# Patient Record
Sex: MALE | Race: WHITE | Employment: FULL TIME | URBAN - METROPOLITAN AREA
[De-identification: names, ages, dates, MRNs, and addresses within clinical notes are randomized per-mention and may not be internally consistent; named-entity substitution may affect disease eponyms.]

---

## 2019-06-12 ENCOUNTER — APPOINTMENT (OUTPATIENT)
Dept: GENERAL RADIOLOGY | Age: 39
DRG: 816 | End: 2019-06-12
Payer: MEDICARE

## 2019-06-12 ENCOUNTER — HOSPITAL ENCOUNTER (INPATIENT)
Age: 39
LOS: 4 days | Discharge: HOME | DRG: 816 | End: 2019-06-16
Attending: EMERGENCY MEDICINE | Admitting: INTERNAL MEDICINE
Payer: MEDICARE

## 2019-06-12 ENCOUNTER — APPOINTMENT (OUTPATIENT)
Dept: CT IMAGING | Age: 39
DRG: 816 | End: 2019-06-12
Payer: MEDICARE

## 2019-06-12 ENCOUNTER — APPOINTMENT (OUTPATIENT)
Dept: ULTRASOUND IMAGING | Age: 39
DRG: 816 | End: 2019-06-12
Payer: MEDICARE

## 2019-06-12 DIAGNOSIS — R41.82 ALTERED MENTAL STATUS, UNSPECIFIED ALTERED MENTAL STATUS TYPE: Primary | ICD-10-CM

## 2019-06-12 DIAGNOSIS — F19.10 DRUG ABUSE (HCC): ICD-10-CM

## 2019-06-12 DIAGNOSIS — E86.0 DEHYDRATION: ICD-10-CM

## 2019-06-12 DIAGNOSIS — R74.8 ELEVATED LIVER ENZYMES: ICD-10-CM

## 2019-06-12 PROBLEM — T50.901A DRUG OVERDOSE: Status: ACTIVE | Noted: 2019-06-12

## 2019-06-12 LAB
ACETAMINOPHEN LEVEL: <5 MCG/ML (ref 10–30)
ALBUMIN SERPL-MCNC: 3.1 G/DL (ref 3.5–5.2)
ALBUMIN SERPL-MCNC: 4.4 G/DL (ref 3.5–5.2)
ALP BLD-CCNC: 143 U/L (ref 40–129)
ALP BLD-CCNC: 87 U/L (ref 40–129)
ALT SERPL-CCNC: 407 U/L (ref 0–40)
ALT SERPL-CCNC: 469 U/L (ref 0–40)
AMMONIA: 72 UMOL/L (ref 16–60)
AMPHETAMINE SCREEN, URINE: POSITIVE
ANION GAP SERPL CALCULATED.3IONS-SCNC: 11 MMOL/L (ref 7–16)
ANION GAP SERPL CALCULATED.3IONS-SCNC: 26 MMOL/L (ref 7–16)
ANION GAP SERPL CALCULATED.3IONS-SCNC: 9 MMOL/L (ref 7–16)
ANTISTREPTOLYSIN-O: 224 IU/ML (ref 0–200)
AST SERPL-CCNC: 484 U/L (ref 0–39)
AST SERPL-CCNC: 682 U/L (ref 0–39)
B.E.: -12.8 MMOL/L (ref -3–3)
B.E.: -14.9 MMOL/L (ref -3–3)
B.E.: -2.7 MMOL/L (ref -3–3)
BACTERIA: ABNORMAL /HPF
BARBITURATE SCREEN URINE: NOT DETECTED
BASOPHILS ABSOLUTE: 0 E9/L (ref 0–0.2)
BASOPHILS RELATIVE PERCENT: 0 % (ref 0–2)
BENZODIAZEPINE SCREEN, URINE: NOT DETECTED
BILIRUB SERPL-MCNC: 0.4 MG/DL (ref 0–1.2)
BILIRUB SERPL-MCNC: 0.7 MG/DL (ref 0–1.2)
BILIRUBIN URINE: NEGATIVE
BLOOD, URINE: ABNORMAL
BUN BLDV-MCNC: 20 MG/DL (ref 6–20)
BUN BLDV-MCNC: 24 MG/DL (ref 6–20)
BUN BLDV-MCNC: 25 MG/DL (ref 6–20)
BURR CELLS: ABNORMAL
CALCIUM SERPL-MCNC: 7.1 MG/DL (ref 8.6–10.2)
CALCIUM SERPL-MCNC: 7.4 MG/DL (ref 8.6–10.2)
CALCIUM SERPL-MCNC: 8.5 MG/DL (ref 8.6–10.2)
CANNABINOID SCREEN URINE: NOT DETECTED
CASTS: ABNORMAL /LPF
CHLORIDE BLD-SCNC: 105 MMOL/L (ref 98–107)
CHLORIDE BLD-SCNC: 106 MMOL/L (ref 98–107)
CHLORIDE BLD-SCNC: 93 MMOL/L (ref 98–107)
CLARITY: CLEAR
CO2: 19 MMOL/L (ref 22–29)
CO2: 23 MMOL/L (ref 22–29)
CO2: 24 MMOL/L (ref 22–29)
COCAINE METABOLITE SCREEN URINE: POSITIVE
COHB: 1.2 % (ref 0–1.5)
COHB: 3.1 % (ref 0–1.5)
COLOR: YELLOW
COMMENT: ABNORMAL
CREAT SERPL-MCNC: 1.1 MG/DL (ref 0.7–1.2)
CREAT SERPL-MCNC: 1.1 MG/DL (ref 0.7–1.2)
CREAT SERPL-MCNC: 1.7 MG/DL (ref 0.7–1.2)
CRITICAL NOTIFICATION: YES
CRITICAL: ABNORMAL
CRITICAL: ABNORMAL
DATE ANALYZED: ABNORMAL
DATE ANALYZED: ABNORMAL
DATE OF COLLECTION: ABNORMAL
DATE OF COLLECTION: ABNORMAL
DELIVERY SYSTEMS: ABNORMAL
DEVICE: ABNORMAL
EOSINOPHILS ABSOLUTE: 0 E9/L (ref 0.05–0.5)
EOSINOPHILS RELATIVE PERCENT: 0 % (ref 0–6)
ETHANOL: 41 MG/DL (ref 0–0.08)
FIO2 ARTERIAL: 60
FIO2: 30 %
FIO2: 40 %
GFR AFRICAN AMERICAN: 55
GFR AFRICAN AMERICAN: >60
GFR AFRICAN AMERICAN: >60
GFR NON-AFRICAN AMERICAN: 46 ML/MIN/1.73
GFR NON-AFRICAN AMERICAN: >60 ML/MIN/1.73
GFR NON-AFRICAN AMERICAN: >60 ML/MIN/1.73
GLUCOSE BLD-MCNC: 102 MG/DL (ref 74–99)
GLUCOSE BLD-MCNC: 108 MG/DL (ref 74–99)
GLUCOSE BLD-MCNC: 233 MG/DL (ref 74–99)
GLUCOSE URINE: NEGATIVE MG/DL
HCO3 ARTERIAL: 16.9 MMOL/L (ref 22–26)
HCO3: 14.5 MMOL/L (ref 22–26)
HCO3: 21.3 MMOL/L (ref 22–26)
HCT VFR BLD CALC: 52.2 % (ref 37–54)
HEMOGLOBIN: 17.3 G/DL (ref 12.5–16.5)
HHB: 1.8 % (ref 0–5)
HHB: 1.9 % (ref 0–5)
KETONES, URINE: NEGATIVE MG/DL
LAB: ABNORMAL
LAB: ABNORMAL
LACTIC ACID: 1.2 MMOL/L (ref 0.5–2.2)
LACTIC ACID: 11.7 MMOL/L (ref 0.5–2.2)
LACTIC ACID: 2.5 MMOL/L (ref 0.5–2.2)
LACTIC ACID: 7.4 MMOL/L (ref 0.5–2.2)
LEUKOCYTE ESTERASE, URINE: NEGATIVE
LYMPHOCYTES ABSOLUTE: 1.8 E9/L (ref 1.5–4)
LYMPHOCYTES RELATIVE PERCENT: 15 % (ref 20–42)
Lab: ABNORMAL
Lab: ABNORMAL
MAGNESIUM: 2 MG/DL (ref 1.6–2.6)
MCH RBC QN AUTO: 33.1 PG (ref 26–35)
MCHC RBC AUTO-ENTMCNC: 33.1 % (ref 32–34.5)
MCV RBC AUTO: 100 FL (ref 80–99.9)
METAMYELOCYTES RELATIVE PERCENT: 1 % (ref 0–1)
METHADONE SCREEN, URINE: NOT DETECTED
METHB: 0.5 % (ref 0–1.5)
METHB: 0.5 % (ref 0–1.5)
MODE: AC
MONOCYTES ABSOLUTE: 0.12 E9/L (ref 0.1–0.95)
MONOCYTES RELATIVE PERCENT: 1 % (ref 2–12)
NEUTROPHILS ABSOLUTE: 10.08 E9/L (ref 1.8–7.3)
NEUTROPHILS RELATIVE PERCENT: 83 % (ref 43–80)
NITRITE, URINE: NEGATIVE
O2 CONTENT: 20.6 ML/DL
O2 CONTENT: 20.9 ML/DL
O2 SATURATION: 98.1 % (ref 92–98.5)
O2 SATURATION: 98.1 % (ref 92–98.5)
O2 SATURATION: 99.5 % (ref 92–98.5)
O2HB: 94.6 % (ref 94–97)
O2HB: 96.4 % (ref 94–97)
OPERATOR ID: 208
OPERATOR ID: 2658
OPERATOR ID: ABNORMAL
OPIATE SCREEN URINE: NOT DETECTED
OSMOLALITY: 297 MOSM/KG (ref 285–310)
PATIENT TEMP: 37 C
PATIENT TEMP: 37 C
PCO2 ARTERIAL: 62.5 MMHG (ref 35–45)
PCO2: 34.9 MMHG (ref 35–45)
PCO2: 38 MMHG (ref 35–45)
PDW BLD-RTO: 12.1 FL (ref 11.5–15)
PEEP/CPAP: 5 CMH2O
PEEP/CPAP: 5 CMH2O
PFO2: 3.83 MMHG/%
PFO2: 4.38 MMHG/%
PH BLOOD GAS: 7.04 (ref 7.35–7.45)
PH BLOOD GAS: 7.2 (ref 7.35–7.45)
PH BLOOD GAS: 7.4 (ref 7.35–7.45)
PH UA: 6.5 (ref 5–9)
PHENCYCLIDINE SCREEN URINE: NOT DETECTED
PHOSPHORUS: 2.7 MG/DL (ref 2.5–4.5)
PLATELET # BLD: 206 E9/L (ref 130–450)
PMV BLD AUTO: 10.2 FL (ref 7–12)
PO2 ARTERIAL: 236.1 MMHG (ref 80–100)
PO2: 131.3 MMHG (ref 60–100)
PO2: 153.4 MMHG (ref 60–100)
POIKILOCYTES: ABNORMAL
POSITIVE END EXP PRESS: 5 CMH2O
POTASSIUM REFLEX MAGNESIUM: 4.4 MMOL/L (ref 3.5–5)
POTASSIUM SERPL-SCNC: 3.9 MMOL/L (ref 3.5–5)
POTASSIUM SERPL-SCNC: 4 MMOL/L (ref 3.5–5)
PROCALCITONIN: 0.03 NG/ML (ref 0–0.08)
PROPOXYPHENE SCREEN: NOT DETECTED
PROTEIN UA: 100 MG/DL
RBC # BLD: 5.22 E12/L (ref 3.8–5.8)
RBC UA: ABNORMAL /HPF (ref 0–2)
RESPIRATORY RATE: 14 B/MIN
RI(T): 0.26
RI(T): 0.51
RR MECHANICAL: 18 B/MIN
RR MECHANICAL: 18 B/MIN
SALICYLATE, SERUM: <0.3 MG/DL (ref 0–30)
SODIUM BLD-SCNC: 138 MMOL/L (ref 132–146)
SODIUM BLD-SCNC: 138 MMOL/L (ref 132–146)
SODIUM BLD-SCNC: 140 MMOL/L (ref 132–146)
SOURCE, BLOOD GAS: ABNORMAL
SPECIFIC GRAVITY UA: 1.02 (ref 1–1.03)
STREP GRP A PCR: NEGATIVE
THB: 15.1 G/DL (ref 11.5–16.5)
THB: 15.5 G/DL (ref 11.5–16.5)
TIDAL VOLUME: 450 ML
TIME ANALYZED: 1335
TIME ANALYZED: 931
TOTAL CK: 189 U/L (ref 20–200)
TOTAL PROTEIN: 5.4 G/DL (ref 6.4–8.3)
TOTAL PROTEIN: 7.9 G/DL (ref 6.4–8.3)
TRICYCLIC ANTIDEPRESSANTS SCREEN SERUM: NEGATIVE NG/ML
TROPONIN: <0.01 NG/ML (ref 0–0.03)
TROPONIN: <0.01 NG/ML (ref 0–0.03)
UROBILINOGEN, URINE: 1 E.U./DL
VT MECHANICAL: 450 ML
VT MECHANICAL: 450 ML
WBC # BLD: 12 E9/L (ref 4.5–11.5)
WBC UA: ABNORMAL /HPF (ref 0–5)

## 2019-06-12 PROCEDURE — 84145 PROCALCITONIN (PCT): CPT

## 2019-06-12 PROCEDURE — 87081 CULTURE SCREEN ONLY: CPT

## 2019-06-12 PROCEDURE — 0BH17EZ INSERTION OF ENDOTRACHEAL AIRWAY INTO TRACHEA, VIA NATURAL OR ARTIFICIAL OPENING: ICD-10-PCS | Performed by: INTERNAL MEDICINE

## 2019-06-12 PROCEDURE — 72125 CT NECK SPINE W/O DYE: CPT

## 2019-06-12 PROCEDURE — 70450 CT HEAD/BRAIN W/O DYE: CPT

## 2019-06-12 PROCEDURE — 82784 ASSAY IGA/IGD/IGG/IGM EACH: CPT

## 2019-06-12 PROCEDURE — 82805 BLOOD GASES W/O2 SATURATION: CPT

## 2019-06-12 PROCEDURE — 2500000003 HC RX 250 WO HCPCS: Performed by: EMERGENCY MEDICINE

## 2019-06-12 PROCEDURE — 94002 VENT MGMT INPAT INIT DAY: CPT

## 2019-06-12 PROCEDURE — G0480 DRUG TEST DEF 1-7 CLASSES: HCPCS

## 2019-06-12 PROCEDURE — 36592 COLLECT BLOOD FROM PICC: CPT

## 2019-06-12 PROCEDURE — 02HV33Z INSERTION OF INFUSION DEVICE INTO SUPERIOR VENA CAVA, PERCUTANEOUS APPROACH: ICD-10-PCS | Performed by: EMERGENCY MEDICINE

## 2019-06-12 PROCEDURE — 6360000002 HC RX W HCPCS: Performed by: INTERNAL MEDICINE

## 2019-06-12 PROCEDURE — 2580000003 HC RX 258: Performed by: INTERNAL MEDICINE

## 2019-06-12 PROCEDURE — 2580000003 HC RX 258: Performed by: EMERGENCY MEDICINE

## 2019-06-12 PROCEDURE — 71045 X-RAY EXAM CHEST 1 VIEW: CPT

## 2019-06-12 PROCEDURE — 6360000002 HC RX W HCPCS

## 2019-06-12 PROCEDURE — C9113 INJ PANTOPRAZOLE SODIUM, VIA: HCPCS | Performed by: INTERNAL MEDICINE

## 2019-06-12 PROCEDURE — 80048 BASIC METABOLIC PNL TOTAL CA: CPT

## 2019-06-12 PROCEDURE — 2580000003 HC RX 258

## 2019-06-12 PROCEDURE — 36415 COLL VENOUS BLD VENIPUNCTURE: CPT

## 2019-06-12 PROCEDURE — 2500000003 HC RX 250 WO HCPCS: Performed by: INTERNAL MEDICINE

## 2019-06-12 PROCEDURE — 80307 DRUG TEST PRSMV CHEM ANLYZR: CPT

## 2019-06-12 PROCEDURE — 96361 HYDRATE IV INFUSION ADD-ON: CPT

## 2019-06-12 PROCEDURE — 5A1945Z RESPIRATORY VENTILATION, 24-96 CONSECUTIVE HOURS: ICD-10-PCS | Performed by: INTERNAL MEDICINE

## 2019-06-12 PROCEDURE — 99285 EMERGENCY DEPT VISIT HI MDM: CPT

## 2019-06-12 PROCEDURE — 81001 URINALYSIS AUTO W/SCOPE: CPT

## 2019-06-12 PROCEDURE — 96365 THER/PROPH/DIAG IV INF INIT: CPT

## 2019-06-12 PROCEDURE — 86703 HIV-1/HIV-2 1 RESULT ANTBDY: CPT

## 2019-06-12 PROCEDURE — 86060 ANTISTREPTOLYSIN O TITER: CPT

## 2019-06-12 PROCEDURE — 93005 ELECTROCARDIOGRAM TRACING: CPT | Performed by: EMERGENCY MEDICINE

## 2019-06-12 PROCEDURE — 82550 ASSAY OF CK (CPK): CPT

## 2019-06-12 PROCEDURE — 82803 BLOOD GASES ANY COMBINATION: CPT

## 2019-06-12 PROCEDURE — 84484 ASSAY OF TROPONIN QUANT: CPT

## 2019-06-12 PROCEDURE — 96376 TX/PRO/DX INJ SAME DRUG ADON: CPT

## 2019-06-12 PROCEDURE — 83735 ASSAY OF MAGNESIUM: CPT

## 2019-06-12 PROCEDURE — 87450 HC DIRECT STREP B ANTIGEN: CPT

## 2019-06-12 PROCEDURE — 83930 ASSAY OF BLOOD OSMOLALITY: CPT

## 2019-06-12 PROCEDURE — 96366 THER/PROPH/DIAG IV INF ADDON: CPT

## 2019-06-12 PROCEDURE — 2500000003 HC RX 250 WO HCPCS

## 2019-06-12 PROCEDURE — 94664 DEMO&/EVAL PT USE INHALER: CPT

## 2019-06-12 PROCEDURE — 96375 TX/PRO/DX INJ NEW DRUG ADDON: CPT

## 2019-06-12 PROCEDURE — 87040 BLOOD CULTURE FOR BACTERIA: CPT

## 2019-06-12 PROCEDURE — 2000000000 HC ICU R&B

## 2019-06-12 PROCEDURE — 6360000002 HC RX W HCPCS: Performed by: EMERGENCY MEDICINE

## 2019-06-12 PROCEDURE — 80074 ACUTE HEPATITIS PANEL: CPT

## 2019-06-12 PROCEDURE — 80053 COMPREHEN METABOLIC PANEL: CPT

## 2019-06-12 PROCEDURE — 84100 ASSAY OF PHOSPHORUS: CPT

## 2019-06-12 PROCEDURE — 82140 ASSAY OF AMMONIA: CPT

## 2019-06-12 PROCEDURE — 83605 ASSAY OF LACTIC ACID: CPT

## 2019-06-12 PROCEDURE — 85025 COMPLETE CBC W/AUTO DIFF WBC: CPT

## 2019-06-12 PROCEDURE — 87880 STREP A ASSAY W/OPTIC: CPT

## 2019-06-12 PROCEDURE — 6370000000 HC RX 637 (ALT 250 FOR IP): Performed by: INTERNAL MEDICINE

## 2019-06-12 RX ORDER — 0.9 % SODIUM CHLORIDE 0.9 %
500 INTRAVENOUS SOLUTION INTRAVENOUS ONCE
Status: COMPLETED | OUTPATIENT
Start: 2019-06-12 | End: 2019-06-12

## 2019-06-12 RX ORDER — SODIUM CHLORIDE 0.9 % (FLUSH) 0.9 %
SYRINGE (ML) INJECTION
Status: COMPLETED
Start: 2019-06-12 | End: 2019-06-12

## 2019-06-12 RX ORDER — DILTIAZEM HYDROCHLORIDE 5 MG/ML
7.5 INJECTION INTRAVENOUS ONCE
Status: DISCONTINUED | OUTPATIENT
Start: 2019-06-12 | End: 2019-06-12 | Stop reason: CLARIF

## 2019-06-12 RX ORDER — VECURONIUM BROMIDE 1 MG/ML
10 INJECTION, POWDER, LYOPHILIZED, FOR SOLUTION INTRAVENOUS ONCE
Status: COMPLETED | OUTPATIENT
Start: 2019-06-12 | End: 2019-06-12

## 2019-06-12 RX ORDER — IBUPROFEN 200 MG
800 TABLET ORAL EVERY 6 HOURS PRN
COMMUNITY

## 2019-06-12 RX ORDER — LORAZEPAM 2 MG/ML
2 INJECTION INTRAMUSCULAR ONCE
Status: COMPLETED | OUTPATIENT
Start: 2019-06-12 | End: 2019-06-12

## 2019-06-12 RX ORDER — IPRATROPIUM BROMIDE AND ALBUTEROL SULFATE 2.5; .5 MG/3ML; MG/3ML
1 SOLUTION RESPIRATORY (INHALATION)
Status: DISCONTINUED | OUTPATIENT
Start: 2019-06-12 | End: 2019-06-13

## 2019-06-12 RX ORDER — DEXMEDETOMIDINE HYDROCHLORIDE 4 UG/ML
0.2 INJECTION, SOLUTION INTRAVENOUS CONTINUOUS
Status: DISCONTINUED | OUTPATIENT
Start: 2019-06-12 | End: 2019-06-13

## 2019-06-12 RX ORDER — SODIUM CHLORIDE, SODIUM LACTATE, POTASSIUM CHLORIDE, CALCIUM CHLORIDE 600; 310; 30; 20 MG/100ML; MG/100ML; MG/100ML; MG/100ML
INJECTION, SOLUTION INTRAVENOUS CONTINUOUS
Status: DISCONTINUED | OUTPATIENT
Start: 2019-06-12 | End: 2019-06-15

## 2019-06-12 RX ORDER — PANTOPRAZOLE SODIUM 40 MG/10ML
40 INJECTION, POWDER, LYOPHILIZED, FOR SOLUTION INTRAVENOUS DAILY
Status: DISCONTINUED | OUTPATIENT
Start: 2019-06-12 | End: 2019-06-14

## 2019-06-12 RX ORDER — NALOXONE HYDROCHLORIDE 1 MG/ML
INJECTION INTRAMUSCULAR; INTRAVENOUS; SUBCUTANEOUS
Status: COMPLETED
Start: 2019-06-12 | End: 2019-06-12

## 2019-06-12 RX ORDER — KETAMINE HYDROCHLORIDE 10 MG/ML
1 INJECTION, SOLUTION INTRAMUSCULAR; INTRAVENOUS ONCE
Status: COMPLETED | OUTPATIENT
Start: 2019-06-12 | End: 2019-06-12

## 2019-06-12 RX ORDER — 0.9 % SODIUM CHLORIDE 0.9 %
1000 INTRAVENOUS SOLUTION INTRAVENOUS ONCE
Status: COMPLETED | OUTPATIENT
Start: 2019-06-12 | End: 2019-06-12

## 2019-06-12 RX ORDER — PROPOFOL 10 MG/ML
10 INJECTION, EMULSION INTRAVENOUS
Status: DISCONTINUED | OUTPATIENT
Start: 2019-06-12 | End: 2019-06-13

## 2019-06-12 RX ORDER — SODIUM CHLORIDE 0.9 % (FLUSH) 0.9 %
10 SYRINGE (ML) INJECTION EVERY 12 HOURS SCHEDULED
Status: DISCONTINUED | OUTPATIENT
Start: 2019-06-12 | End: 2019-06-16 | Stop reason: HOSPADM

## 2019-06-12 RX ORDER — KETAMINE HYDROCHLORIDE 10 MG/ML
INJECTION, SOLUTION INTRAMUSCULAR; INTRAVENOUS
Status: COMPLETED
Start: 2019-06-12 | End: 2019-06-12

## 2019-06-12 RX ORDER — SODIUM CHLORIDE 9 MG/ML
INJECTION, SOLUTION INTRAVENOUS CONTINUOUS
Status: DISCONTINUED | OUTPATIENT
Start: 2019-06-12 | End: 2019-06-12

## 2019-06-12 RX ORDER — PROPOFOL 10 MG/ML
10 INJECTION, EMULSION INTRAVENOUS ONCE
Status: COMPLETED | OUTPATIENT
Start: 2019-06-12 | End: 2019-06-12

## 2019-06-12 RX ORDER — SODIUM CHLORIDE 0.9 % (FLUSH) 0.9 %
10 SYRINGE (ML) INJECTION PRN
Status: DISCONTINUED | OUTPATIENT
Start: 2019-06-12 | End: 2019-06-16 | Stop reason: HOSPADM

## 2019-06-12 RX ORDER — LORAZEPAM 2 MG/ML
INJECTION INTRAMUSCULAR
Status: COMPLETED
Start: 2019-06-12 | End: 2019-06-12

## 2019-06-12 RX ORDER — 0.9 % SODIUM CHLORIDE 0.9 %
500 INTRAVENOUS SOLUTION INTRAVENOUS ONCE
Status: DISCONTINUED | OUTPATIENT
Start: 2019-06-12 | End: 2019-06-12

## 2019-06-12 RX ADMIN — SODIUM CHLORIDE 1000 ML: 9 INJECTION, SOLUTION INTRAVENOUS at 08:42

## 2019-06-12 RX ADMIN — ENOXAPARIN SODIUM 40 MG: 40 INJECTION SUBCUTANEOUS at 12:48

## 2019-06-12 RX ADMIN — NALOXONE HYDROCHLORIDE: 1 INJECTION PARENTERAL at 07:00

## 2019-06-12 RX ADMIN — LORAZEPAM 2 MG: 2 INJECTION INTRAMUSCULAR at 07:30

## 2019-06-12 RX ADMIN — LORAZEPAM 1 MG/HR: 2 INJECTION INTRAMUSCULAR; INTRAVENOUS at 08:09

## 2019-06-12 RX ADMIN — Medication 10 ML: at 12:49

## 2019-06-12 RX ADMIN — PROPOFOL 40 MCG/KG/MIN: 10 INJECTION, EMULSION INTRAVENOUS at 23:48

## 2019-06-12 RX ADMIN — KETAMINE HYDROCHLORIDE 74.4 MG: 10 INJECTION, SOLUTION INTRAMUSCULAR; INTRAVENOUS at 10:26

## 2019-06-12 RX ADMIN — WATER 2 G: 1 INJECTION INTRAMUSCULAR; INTRAVENOUS; SUBCUTANEOUS at 08:43

## 2019-06-12 RX ADMIN — PANTOPRAZOLE SODIUM 40 MG: 40 INJECTION, POWDER, FOR SOLUTION INTRAVENOUS at 12:48

## 2019-06-12 RX ADMIN — DEXMEDETOMIDINE HYDROCHLORIDE 0.2 MCG/KG/HR: 4 INJECTION, SOLUTION INTRAVENOUS at 18:48

## 2019-06-12 RX ADMIN — SODIUM CHLORIDE 1000 ML: 9 INJECTION, SOLUTION INTRAVENOUS at 07:12

## 2019-06-12 RX ADMIN — Medication 10 ML: at 07:34

## 2019-06-12 RX ADMIN — IPRATROPIUM BROMIDE AND ALBUTEROL SULFATE 1 AMPULE: .5; 3 SOLUTION RESPIRATORY (INHALATION) at 17:01

## 2019-06-12 RX ADMIN — SODIUM CHLORIDE: 9 INJECTION, SOLUTION INTRAVENOUS at 11:51

## 2019-06-12 RX ADMIN — PROPOFOL 10 MCG/KG/MIN: 10 INJECTION, EMULSION INTRAVENOUS at 11:30

## 2019-06-12 RX ADMIN — PROPOFOL 4.5 ML/HR: 10 INJECTION, EMULSION INTRAVENOUS at 10:32

## 2019-06-12 RX ADMIN — SODIUM CHLORIDE, POTASSIUM CHLORIDE, SODIUM LACTATE AND CALCIUM CHLORIDE: 600; 310; 30; 20 INJECTION, SOLUTION INTRAVENOUS at 20:12

## 2019-06-12 RX ADMIN — SODIUM CHLORIDE 500 ML: 9 INJECTION, SOLUTION INTRAVENOUS at 18:51

## 2019-06-12 RX ADMIN — SODIUM CHLORIDE, POTASSIUM CHLORIDE, SODIUM LACTATE AND CALCIUM CHLORIDE: 600; 310; 30; 20 INJECTION, SOLUTION INTRAVENOUS at 16:03

## 2019-06-12 RX ADMIN — LORAZEPAM 2 MG: 2 INJECTION INTRAMUSCULAR; INTRAVENOUS at 07:30

## 2019-06-12 RX ADMIN — Medication 10 ML: at 20:12

## 2019-06-12 RX ADMIN — VECURONIUM BROMIDE 10 MG: 1 INJECTION, POWDER, LYOPHILIZED, FOR SOLUTION INTRAVENOUS at 07:31

## 2019-06-12 RX ADMIN — SODIUM CHLORIDE 500 ML: 0.9 INJECTION, SOLUTION INTRAVENOUS at 14:54

## 2019-06-12 ASSESSMENT — PULMONARY FUNCTION TESTS
PIF_VALUE: 18
PIF_VALUE: 23
PIF_VALUE: 16
PIF_VALUE: 17

## 2019-06-12 ASSESSMENT — PAIN SCALES - GENERAL
PAINLEVEL_OUTOF10: 0
PAINLEVEL_OUTOF10: 4

## 2019-06-12 NOTE — PROGRESS NOTES
Patient actively trying to pull at all tubes/lines when sedation decreased. Unable to discontinue restraints at this time for patient's safety. Will continue to reassess.

## 2019-06-12 NOTE — ED PROVIDER NOTES
PERRL, staring straight ahead, minimally responsive to light  Mouth: Oropharynx clear, no gag reflex present  Neck: Supple  Pulmonary: Lungs clear to auscultation bilaterally, no wheezes, rales, or rhonchi. Not in respiratory distress  Cardiovascular:  Regular rate. Regular rhythm. No murmurs, gallops, or rubs. 2+ distal pulses  Chest: no chest wall tenderness  Abdomen: Soft. Non tender. Non distended. +BS. No rebound, guarding, or rigidity. No pulsatile masses appreciated. Musculoskeletal: . Warm and well perfused, no clubbing, cyanosis, or edema. Capillary refill <3 seconds, patient does not move any extremity to pain  Skin: warm and dry. No rashes. Neurologic: GCS 3  Psych: Unable to be evaluated secondary to the acuity of the patient's condition    -------------------------------------------------- RESULTS -------------------------------------------------  I have personally reviewed all laboratory and imaging results for this patient. Results are listed below. LABS:  Results for orders placed or performed during the hospital encounter of 06/12/19   Culture Blood #1   Result Value Ref Range    Blood Culture, Routine 5 Days- no growth    Culture Blood #2   Result Value Ref Range    Culture, Blood 2 5 Days- no growth    Strep screen group a throat   Result Value Ref Range    Strep Grp A PCR Negative Negative   Legionella antigen, urine   Result Value Ref Range    L. pneumophila Serogp 1 Ur Ag       Presumptive NEGATIVE suggesting no recent or current infections  with Legionella pneumophilia serogroup 1. Infection to  Legionella cannot be ruled out since other serogroups and  species may cause infection, antigen may not be present in  early infection, or level of antigen may be below the  detection limit. Strep Pneumoniae Antigen   Result Value Ref Range    STREP PNEUMONIAE ANTIGEN, URINE       Presumptive NEGATIVE for Pneumococcal pneumonia, suggesting  no current or recent pneumococcal infection. Infection due  to S. pneumoniae cannot be ruled out since the antigen present  in the sample may be below the detection limit of the test.     MRSA SCREENING CULTURE ONLY   Result Value Ref Range    MRSA Culture Only Methicillin resistant Staph aureus not isolated    CBC Auto Differential   Result Value Ref Range    WBC 12.0 (H) 4.5 - 11.5 E9/L    RBC 5.22 3.80 - 5.80 E12/L    Hemoglobin 17.3 (H) 12.5 - 16.5 g/dL    Hematocrit 52.2 37.0 - 54.0 %    .0 (H) 80.0 - 99.9 fL    MCH 33.1 26.0 - 35.0 pg    MCHC 33.1 32.0 - 34.5 %    RDW 12.1 11.5 - 15.0 fL    Platelets 995 401 - 231 E9/L    MPV 10.2 7.0 - 12.0 fL    Neutrophils % 83.0 (H) 43.0 - 80.0 %    Lymphocytes % 15.0 (L) 20.0 - 42.0 %    Monocytes % 1.0 (L) 2.0 - 12.0 %    Eosinophils % 0.0 0.0 - 6.0 %    Basophils % 0.0 0.0 - 2.0 %    Neutrophils # 10.08 (H) 1.80 - 7.30 E9/L    Lymphocytes # 1.80 1.50 - 4.00 E9/L    Monocytes # 0.12 0.10 - 0.95 E9/L    Eosinophils # 0.00 (L) 0.05 - 0.50 E9/L    Basophils # 0.00 0.00 - 0.20 E9/L    Metamyelocytes Relative 1.0 0.0 - 1.0 %    Poikilocytes 1+     Mica Cells 1+    Comprehensive Metabolic Panel w/ Reflex to MG   Result Value Ref Range    Sodium 138 132 - 146 mmol/L    Potassium reflex Magnesium 4.4 3.5 - 5.0 mmol/L    Chloride 93 (L) 98 - 107 mmol/L    CO2 19 (L) 22 - 29 mmol/L    Anion Gap 26 (H) 7 - 16 mmol/L    Glucose 233 (H) 74 - 99 mg/dL    BUN 20 6 - 20 mg/dL    CREATININE 1.7 (H) 0.7 - 1.2 mg/dL    GFR Non-African American 46 >=60 mL/min/1.73    GFR African American 55     Calcium 8.5 (L) 8.6 - 10.2 mg/dL    Total Protein 7.9 6.4 - 8.3 g/dL    Alb 4.4 3.5 - 5.2 g/dL    Total Bilirubin 0.7 0.0 - 1.2 mg/dL    Alkaline Phosphatase 143 (H) 40 - 129 U/L     (H) 0 - 40 U/L     (H) 0 - 39 U/L   Urinalysis, reflex to microscopic   Result Value Ref Range    Color, UA Yellow Straw/Yellow    Clarity, UA Clear Clear    Glucose, Ur Negative Negative mg/dL    Bilirubin Urine Negative Negative    Ketones, WBC, UA 2-5 0 - 5 /HPF    RBC, UA 1-3 0 - 2 /HPF    Bacteria, UA RARE (A) /HPF   Lactic Acid, Plasma   Result Value Ref Range    Lactic Acid 7.4 (HH) 0.5 - 2.2 mmol/L   Osmolality, Serum   Result Value Ref Range    Osmolality 297 285 - 310 mOsm/Kg   Blood Gas, Arterial   Result Value Ref Range    Date Analyzed [id number]     Time Analyzed 0931     Source: Blood Arterial     pH, Blood Gas 7.198 (LL) 7.350 - 7.450    PCO2 38.0 35.0 - 45.0 mmHg    PO2 153.4 (H) 60.0 - 100.0 mmHg    HCO3 14.5 (L) 22.0 - 26.0 mmol/L    B.E. -12.8 (L) -3.0 - 3.0 mmol/L    O2 Sat 98.1 92.0 - 98.5 %    PO2/FIO2 3.83 mmHg/%    RI(T) 0.51     O2Hb 94.6 94.0 - 97.0 %    COHb 3.1 (H) 0.0 - 1.5 %    MetHb 0.5 0.0 - 1.5 %    O2 Content 20.9 mL/dL    HHb 1.8 0.0 - 5.0 %    tHb (est) 15.5 11.5 - 16.5 g/dL    Mode AC     FIO2 40.0 %    Rr Mechanical 18.0 b/min    Vt Mechanical 450.0 mL    Peep/Cpap 5.0 cmH2O    Comment with readback     Date Of Collection      Time Collected      Pt Temp 37.0 C     ID [id number]     Lab [id number]     Critical(s) Notified Called to ETIENNE Sorensen    Lactic Acid, Plasma   Result Value Ref Range    Lactic Acid 2.5 (H) 0.5 - 2.2 mmol/L   Lactic Acid, Plasma   Result Value Ref Range    Lactic Acid 1.2 0.5 - 2.2 mmol/L   CK   Result Value Ref Range    Total  20 - 200 U/L   Hepatitis panel, acute   Result Value Ref Range    Hep A IgM Non-Reactive NON REACT    Hep B Core Ab, IgM Non-Reactive NON REACT    Hep B S Ag Interp Non-Reactive NON REACT    Hep C Ab Interp REACTIVE (A) NON REACT   HIV Screen   Result Value Ref Range    HIV-1/HIV-2 Ab Non-Reactive NON REACT   Magnesium   Result Value Ref Range    Magnesium 2.0 1.6 - 2.6 mg/dL   Phosphorus   Result Value Ref Range    Phosphorus 2.7 2.5 - 4.5 mg/dL   Troponin   Result Value Ref Range    Troponin <0.01 0.00 - 0.03 ng/mL   Antistreptolysin O titer   Result Value Ref Range     (H) 0 - 200 IU/mL   Procalcitonin   Result Value Ref Range    Procalcitonin 0.03 0.00 - 8.3 g/dL    Alb 3.1 (L) 3.5 - 5.2 g/dL    Total Bilirubin 0.4 0.0 - 1.2 mg/dL    Alkaline Phosphatase 87 40 - 129 U/L     (H) 0 - 40 U/L     (H) 0 - 39 U/L   Amphetamines, Urine   Result Value Ref Range    Amphetamines, urine 390 ng/mL    Methamphetamine, Urine >10,000 ng/mL    Methylenedioxyamphetamine, Ur <200 ng/mL    Methylenedioxymethamphetamine, Ur <200 ng/mL    Methylenedioxyethylamphetamine, Ur <200 ng/mL   Protime-INR   Result Value Ref Range    Protime 12.3 9.3 - 12.4 sec    INR 1.1    Comprehensive Metabolic Panel   Result Value Ref Range    Sodium 139 132 - 146 mmol/L    Potassium 3.5 3.5 - 5.0 mmol/L    Chloride 105 98 - 107 mmol/L    CO2 29 22 - 29 mmol/L    Anion Gap 5 (L) 7 - 16 mmol/L    Glucose 111 (H) 74 - 99 mg/dL    BUN 11 6 - 20 mg/dL    CREATININE 0.9 0.7 - 1.2 mg/dL    GFR Non-African American >60 >=60 mL/min/1.73    GFR African American >60     Calcium 7.6 (L) 8.6 - 10.2 mg/dL    Total Protein 5.2 (L) 6.4 - 8.3 g/dL    Alb 2.7 (L) 3.5 - 5.2 g/dL    Total Bilirubin 0.4 0.0 - 1.2 mg/dL    Alkaline Phosphatase 82 40 - 129 U/L     (H) 0 - 40 U/L     (H) 0 - 39 U/L   CBC   Result Value Ref Range    WBC 13.0 (H) 4.5 - 11.5 E9/L    RBC 3.94 3.80 - 5.80 E12/L    Hemoglobin 13.2 12.5 - 16.5 g/dL    Hematocrit 37.8 37.0 - 54.0 %    MCV 95.9 80.0 - 99.9 fL    MCH 33.5 26.0 - 35.0 pg    MCHC 34.9 (H) 32.0 - 34.5 %    RDW 12.6 11.5 - 15.0 fL    Platelets 364 793 - 542 E9/L    MPV 10.8 7.0 - 12.0 fL   CK   Result Value Ref Range    Total CK 1,116 (H) 20 - 200 U/L   Protime-INR   Result Value Ref Range    Protime 12.6 (H) 9.3 - 12.4 sec    INR 1.1    Comprehensive Metabolic Panel   Result Value Ref Range    Sodium 140 132 - 146 mmol/L    Potassium 3.7 3.5 - 5.0 mmol/L    Chloride 105 98 - 107 mmol/L    CO2 27 22 - 29 mmol/L    Anion Gap 8 7 - 16 mmol/L    Glucose 107 (H) 74 - 99 mg/dL    BUN 9 6 - 20 mg/dL    CREATININE 0.7 0.7 - 1.2 mg/dL    GFR Non-African American >60 >=60 mL/min/1.73    GFR African American >60     Calcium 8.7 8.6 - 10.2 mg/dL    Total Protein 5.6 (L) 6.4 - 8.3 g/dL    Alb 3.1 (L) 3.5 - 5.2 g/dL    Total Bilirubin 0.4 0.0 - 1.2 mg/dL    Alkaline Phosphatase 69 40 - 129 U/L     (H) 0 - 40 U/L     (H) 0 - 39 U/L   CBC   Result Value Ref Range    WBC 8.5 4.5 - 11.5 E9/L    RBC 4.01 3.80 - 5.80 E12/L    Hemoglobin 13.5 12.5 - 16.5 g/dL    Hematocrit 38.4 37.0 - 54.0 %    MCV 95.8 80.0 - 99.9 fL    MCH 33.7 26.0 - 35.0 pg    MCHC 35.2 (H) 32.0 - 34.5 %    RDW 12.5 11.5 - 15.0 fL    Platelets 011 210 - 568 E9/L    MPV 11.0 7.0 - 12.0 fL   Comprehensive Metabolic Panel   Result Value Ref Range    Sodium 139 132 - 146 mmol/L    Potassium 3.7 3.5 - 5.0 mmol/L    Chloride 104 98 - 107 mmol/L    CO2 27 22 - 29 mmol/L    Anion Gap 8 7 - 16 mmol/L    Glucose 112 (H) 74 - 99 mg/dL    BUN 13 6 - 20 mg/dL    CREATININE 0.8 0.7 - 1.2 mg/dL    GFR Non-African American >60 >=60 mL/min/1.73    GFR African American >60     Calcium 8.8 8.6 - 10.2 mg/dL    Total Protein 6.0 (L) 6.4 - 8.3 g/dL    Alb 3.1 (L) 3.5 - 5.2 g/dL    Total Bilirubin 0.3 0.0 - 1.2 mg/dL    Alkaline Phosphatase 82 40 - 129 U/L     (H) 0 - 40 U/L     (H) 0 - 39 U/L   CBC   Result Value Ref Range    WBC 8.1 4.5 - 11.5 E9/L    RBC 4.30 3.80 - 5.80 E12/L    Hemoglobin 14.2 12.5 - 16.5 g/dL    Hematocrit 41.3 37.0 - 54.0 %    MCV 96.0 80.0 - 99.9 fL    MCH 33.0 26.0 - 35.0 pg    MCHC 34.4 32.0 - 34.5 %    RDW 12.6 11.5 - 15.0 fL    Platelets 269 852 - 733 E9/L    MPV 11.1 7.0 - 12.0 fL   EKG 12 Lead   Result Value Ref Range    Ventricular Rate 86 BPM    Atrial Rate 86 BPM    P-R Interval 140 ms    QRS Duration 100 ms    Q-T Interval 422 ms    QTc Calculation (Bazett) 504 ms    P Axis 78 degrees    R Axis 82 degrees    T Axis 68 degrees       RADIOLOGY:  Interpreted by Radiologist.  XR CHEST PORTABLE   Final Result   No acute cardiopulmonary disease.       US DUP ABD PEL RETRO SCROT LIMITED Final Result      Mild hepatomegaly with moderate degree of diffuse fatty infiltration   without focal lesion. US ABDOMEN LIMITED   Final Result      Moderate diffuse fatty infiltration of the liver without focal lesion. XR CHEST PORTABLE   Final Result   1. No acute cardiopulmonary disease. 2. Support devices unchanged in position. CT Head WO Contrast   Final Result   1. No acute intracranial hemorrhage or mass effect. 2. Left ethmoid and maxillary sinus disease. CT Cervical Spine WO Contrast   Final Result   1. No acute cervical spine fracture. 2. Nonspecific straightening of the cervical lordosis. XR CHEST PORTABLE   Final Result   Endotracheal tube tip in appropriate position. XR Chest Abdomen Ng Placement   Final Result   1. Endotracheal tube tip projects within the upper trachea   approximately 14 cm above the level the kareen. Advancement is   recommended. 2. OG tube tip terminates within the stomach. 3. No acute cardiopulmonary disease. EKG Interpretation  Interpreted by emergency department physician    Rhythm: normal sinus   Rate: 86  Axis: normal  Conduction: normal. Long QT  ST Segments: no acute change  T Waves: no acute change    Clinical Impression: no acute changes        ------------------------- NURSING NOTES AND VITALS REVIEWED ---------------------------   The nursing notes within the ED encounter and vital signs as below have been reviewed by myself. /69   Pulse 67   Temp 97.5 °F (36.4 °C) (Oral)   Resp 20   Ht 5' 11\" (1.803 m)   Wt 164 lb 8 oz (74.6 kg)   SpO2 97%   BMI 22.94 kg/m²   Oxygen Saturation Interpretation: Normal    The patients available past medical records and past encounters were reviewed. ED Course as of Jul 05 0706 Wed Jun 12, 2019   0705 7:05 AM  I received this patient at sign out from Dr. Dara Hong. Rishi Harvey.   I have discussed the patient's initial exam, treatment and plan of care with the states that he kind of passed out as if you do when you are intoxicated. She let him lay sleeping snoring for the next roughly 2 hours and at a point he became as she describes it cyanotic or blue with slow irregular respirations with very long pauses during which time she then called 911. She denies any known drug use, states that he does drink alcohol occasionally and seems to be drunk when he got home. [NC]   [id number] Patient reported to act intoxicated by the girlfriend. He has had no reports it and has no current rashes. He is not hypotensive or tachycardic at this time, he immediately after being intubated had a blood pressure of 97 however it is currently about 275 or 777 systolic. His heart rate is in the 80s and 90s. No signs of an acute allergic or anaphylactic reaction triggering this. He is currently not cyanotic. He has no rashes. No petechia or purpura. No sign of acute head or face injury. Pupils are not pinpoint. [NC]   [id number] Patient apparently moving his head somewhat, he is lying at the tube, watering eyes and moving his mouth however he is not opening his eyes to verbal tactile stimulation, not following commands, not squeezing his extremities. We will sedate for the time being while we finish our evaluation, work-up. [NC]   [id number] Patient still sedated, exam is generally otherwise unchanged, making urine, blood pressure is good, heart rate is good. Slowly warming up. Family present, offers no other known significant history or information. [NC]   [id number] Case discussed with Dr. Desmond Quan, detailed over given, he will admit the patient to the ICU. [NC]   [id number] Case discussed with Dr. Yesica Watts for critical care, detailed review given, he accepts the patient in the ICU and will consult on the patient.     [NC]      ED Course User Index  [NC] Lavern Rizo DO         ------------------------------ ED COURSE/MEDICAL DECISION MAKING----------------------  Medications   0.9 % sodium likely result in a life threatening deterioration or permanent disability. Accordingly this patient received the above mentioned time, excluding separately billable procedures. This patient's ED course included: a personal history and physicial examination, re-evaluation prior to disposition, multiple bedside re-evaluations, IV medications, cardiac monitoring and a personal history and physicial eaxmination    This patient has remained hemodynamically stable and improved during their ED course. Counseling: The emergency provider has spoken with the patient and family   and discussed todays results, in addition to providing specific details for the plan of care and counseling regarding the diagnosis and prognosis. Questions are answered at this time and they are agreeable with the plan.       --------------------------------- IMPRESSION AND DISPOSITION ---------------------------------    IMPRESSION  1. Altered mental status, unspecified altered mental status type    2. Dehydration    3. Elevated liver enzymes    4. Drug abuse ([facility] [address]        DISPOSITION  Disposition: Admit to CCU/ICU  Patient condition is fair        NOTE: This report was transcribed using voice recognition software.  Every effort was made to ensure accuracy; however, inadvertent computerized transcription errors may be present          Frankie Reeder DO  07/05/19 [id number]

## 2019-06-12 NOTE — PROGRESS NOTES
Patient arrives to [facility] via monitored cart accompanied by RN and RT. Transferred to our bed at this time without incident. Please see flowsheets for vitals.

## 2019-06-12 NOTE — H&P
None    Last Echocardiogram -   None     ED TRIAGE VITALS  BP: 111/72, Temp: 93.7 °F (34.3 °C), Pulse: 77, Resp: 18, SpO2: 100 %    Vitals:    06/12/19 1010 06/12/19 1027 06/12/19 1038 06/12/19 1042   BP: 109/70 113/74  111/72   Pulse: 78 91 81 77   Resp: 18 17  18   Temp: 92.5 °F (33.6 °C) 93 °F (33.9 °C)  93.7 °F (34.3 °C)   TempSrc: Core Core  Core   SpO2: 100% 100%  100%   Weight:       Height:             Histories  Past Medical History:   Diagnosis Date    Spinal injury     thoracic and lumbar areas    Ulcer     stomach     Past Surgical History:   Procedure Laterality Date    APPENDECTOMY  [id number]    FRACTURE SURGERY      asiya in right femur    SPLENECTOMY       Family History   Problem Relation Age of Onset    Arthritis Mother     Alcohol Abuse Father     Liver Disease Father        Home Medications  Prior to Admission medications    Medication Sig Start Date End Date Taking?  Authorizing Provider   ibuprofen (ADVIL;MOTRIN) 200 MG tablet Take 800 mg by mouth every 6 hours as needed for Pain   Yes Historical Provider, MD       Allergies  Acetaminophen    Social Hx  Social History     Socioeconomic History    Marital status: Single     Spouse name: Not on file    Number of children: Not on file    Years of education: Not on file    Highest education level: Not on file   Occupational History    Not on file   Social Needs    Financial resource strain: Not on file    Food insecurity:     Worry: Not on file     Inability: Not on file    Transportation needs:     Medical: Not on file     Non-medical: Not on file   Tobacco Use    Smoking status: Current Every Day Smoker     Packs/day: 1.00     Types: Cigarettes    Smokeless tobacco: Never Used   Substance and Sexual Activity    Alcohol use: Yes     Comment: Rarely    Drug use: No    Sexual activity: Not on file   Lifestyle    Physical activity:     Days per week: Not on file     Minutes per session: Not on file    Stress: Not on file Relationships    Social connections:     Talks on phone: Not on file     Gets together: Not on file     Attends Restoration service: Not on file     Active member of club or organization: Not on file     Attends meetings of clubs or organizations: Not on file     Relationship status: Not on file    Intimate partner violence:     Fear of current or ex partner: Not on file     Emotionally abused: Not on file     Physically abused: Not on file     Forced sexual activity: Not on file   Other Topics Concern    Not on file   Social History Narrative    Not on file       Review of Systems  Unable to obtain secondary to condition    Physical Examination  Vitals:  /72   Pulse 77   Temp 93.7 °F (34.3 °C) (Core)   Resp 18   Ht 5' 11\" (1.803 m)   Wt 164 lb (74.4 kg)   SpO2 100%   BMI 22.87 kg/m²   General Appearance:  Intubated, sedated  HEENT:  NCAT; PERRL; conjunctiva pink, sclera clear  Neck:  no adenopathy, bruit, JVD, tenderness, masses, or nodules; supple, symmetrical, trachea midline, thyroid not enlarged  Lung:  clear to auscultation bilaterally; no use of accessory muscles; no rhonchi, rales, or crackles  Heart:  regular rate and regular rhythm without murmur, rub, or gallop  Abdomen:  soft, nontender, nondistended; normoactive bowel sounds; no organomegaly  Extremities:  extremities normal, atraumatic, no cyanosis or edema  Musculokeletal:  no joint swelling, no muscle tenderness. ROM normal in all joints of extremities.    Neurologic: intubated, sedated, moves all 4 extremities      Laboratory Data  Recent Results (from the past 24 hour(s))   Arterial Blood Gas, Respiratory Only    Collection Time: 06/12/19  7:12 AM   Result Value Ref Range    Source: Arterial     FIO2 Arterial 60.0     pH, Blood Gas 7.040 (LL) 7.350 - 7.450    pCO2, Arterial 62.5 (H) 35.0 - 45.0 mmHg    pO2, Arterial 236.1 (H) 80.0 - 100.0 mmHg    HCO3, Arterial 16.9 (L) 22.0 - 26.0 mmol/L    B.E. -14.9 (L) -3.0 - 3.0 mmol/L    O2 Glucose, Ur Negative Negative mg/dL    Bilirubin Urine Negative Negative    Ketones, Urine Negative Negative mg/dL    Specific Gravity, UA 1.020 1.005 - 1.030    Blood, Urine SMALL (A) Negative    pH, UA 6.5 5.0 - 9.0    Protein,  (A) Negative mg/dL    Urobilinogen, Urine 1.0 <2.0 E.U./dL    Nitrite, Urine Negative Negative    Leukocyte Esterase, Urine Negative Negative   Lactic Acid, Plasma    Collection Time: 06/12/19  7:17 AM   Result Value Ref Range    Lactic Acid 11.7 (HH) 0.5 - 2.2 mmol/L   Serum Drug Screen    Collection Time: 06/12/19  7:17 AM   Result Value Ref Range    Ethanol Lvl 41 mg/dL    Acetaminophen Level <5.0 (L) 10.0 - 23.9 mcg/mL    Salicylate, Serum <3.7 0.0 - 30.0 mg/dL    TCA Scrn NEGATIVE Cutoff:300 ng/mL   Urine Drug Screen    Collection Time: 06/12/19  7:17 AM   Result Value Ref Range    Amphetamine Screen, Urine POSITIVE (A) Negative <1000 ng/mL    Barbiturate Screen, Ur NOT DETECTED Negative < 200 ng/mL    Benzodiazepine Screen, Urine NOT DETECTED Negative < 200 ng/mL    Cannabinoid Scrn, Ur NOT DETECTED Negative < 50ng/mL    Cocaine Metabolite Screen, Urine POSITIVE (A) Negative < 300 ng/mL    Opiate Scrn, Ur NOT DETECTED Negative < 300ng/mL    PCP Screen, Urine NOT DETECTED Negative < 25 ng/mL    Methadone Screen, Urine NOT DETECTED Negative <300 ng/mL    Propoxyphene Scrn, Ur NOT DETECTED Negative <300 ng/mL   Ammonia    Collection Time: 06/12/19  7:17 AM   Result Value Ref Range    Ammonia 72.0 (H) 16.0 - 60.0 umol/L   Troponin    Collection Time: 06/12/19  7:17 AM   Result Value Ref Range    Troponin <0.01 0.00 - 0.03 ng/mL   Microscopic Urinalysis    Collection Time: 06/12/19  7:17 AM   Result Value Ref Range    Casts RARE /LPF    WBC, UA 2-5 0 - 5 /HPF    RBC, UA 1-3 0 - 2 /HPF    Bacteria, UA RARE (A) /HPF   EKG 12 Lead    Collection Time: 06/12/19  8:52 AM   Result Value Ref Range    Ventricular Rate 86 BPM    Atrial Rate 86 BPM    P-R Interval 140 ms    QRS Duration 100 ms cells are well aerated. The calvarium is intact. 1. No acute intracranial hemorrhage or mass effect. 2. Left ethmoid and maxillary sinus disease. Ct Cervical Spine Wo Contrast    Result Date: 6/12/2019  Location: 200 Indication: Unresponsive, fall. Comparison: None available. Technique: Multidetector CT imaging of the cervical spine was performed without administration of intravenous contrast. Coronal and sagittal reformatted images were obtained. Automated dose exposure control was used for this exam. Findings: There is nonspecific straightening of the cervical lordosis. Vertebral bodies maintain normal height. Alignment is maintained. Atlantodental distance is preserved. The craniocervical junction is normal in appearance. No acute cervical spine fracture is identified. There is no prevertebral soft tissue edema. Intervertebral discs are preserved in height. There is no significant osseous encroachment of the central canal and neural foramina. Visualized portions of bilateral lung apices are grossly clear. An endotracheal tube and orogastric tube are noted. There are carious lesions of multiple mandibular teeth. 1. No acute cervical spine fracture. 2. Nonspecific straightening of the cervical lordosis. Xr Chest Portable    Result Date: 6/12/2019  Reading location: 200 Indication: Endotracheal tube placement Comparison: Prior chest radiograph from 6/12/2017 at 0714 hours Technique: Portable AP upright chest radiograph was obtained. Findings: Endotracheal tube tip now projects within the mid trachea at the level of the clavicles. An orogastric tube tip terminates within the stomach. The cardiomediastinal silhouette is normal in size and contours. Bilateral lungs and costophrenic angles are clear. There is no evidence of pneumothorax. Endotracheal tube tip in appropriate position.     Xr Chest Abdomen Ng Placement    Result Date: 6/12/2019  Reading location: 200 Indication: Intubation and OG tube placement Comparison: None available Technique: Portable supine radiographs of the chest and upper abdomen were obtained. Findings: Initial radiograph demonstrates a endotracheal tube tip projecting within the upper trachea approximately 14 cm above the level the kareen. The OG tube was initially coiled within the pharynx, however subsequent radiograph demonstrates distal tip within the stomach. The cardiomediastinal silhouette is normal in size and contours. Bilateral lungs and costophrenic angles are clear. There is no evidence of pneumothorax. 1. Endotracheal tube tip projects within the upper trachea approximately 14 cm above the level the kareen. Advancement is recommended. 2. OG tube tip terminates within the stomach. 3. No acute cardiopulmonary disease. Assessment and Plan  Patient is a 41 y.o. male who presented unresponsive. The active problem list is as follows:    · Unresponsive ? due to possible drug overdose vs sepsis possible strep infection   · Recent diagnosis of strep throat, given amoxil  · Acute hypoxic respiratory failure  · HAGMA with lactic acidosis  · UDS positive for amphetamines and cocaine  · Elevated transaminases  · AISHA unknown baseline creatinine      -Check strep swab, ASO, procalcitonin. Will continue rocephin  -Check repeat ABG will make ventilator adjustments  -Trend lactic acid  -HIV and hepatitis panel  -Check CK  -NS @ 125cc/hr for now    · Routine labs in the morning. · DVT prophylaxis. · Please see orders for further management and care. The plan was discussed with Dr. Lucy Simpson.     Mary Kate Reyes DO, PGYII  11:27 AM  6/12/2019

## 2019-06-12 NOTE — CONSULTS
Name:  Kris Gil  :  1980  MRN:  [id number]  Room:  [place] [address]  DOS:  2019    [facility]  Critical Care  -Resident Consult Note-    PCP:  Chapis Houston DO  Admitting Physician:  Margaret Carl DO  Consultants: Critical care  Chief Complaint:    Chief Complaint   Patient presents with    Altered Mental Status     patient found unresponsive by girlfriend on living room floor       History of Present Illness  Kris Gil is a 41 y.o. male who presents to [facility] ER unresponsive. Kris Gil has a past medical history that includes drug abuse.     Roney sent into ER by EMS after being found unresponsive by his girlfriend. No family currently at bedside, history obtained from EMR and ER physician. She stated that he was standing in front of her today and became cyanotic and had a syncopal episode where he was unresponsive. EMS did give 2 mg of Narcan with no response. Reportedly he was seen by another ER yesterday and was diagnosed with strep throat and discharged with Amoxil.     Bard Martinez was intubated in the ER secondary to airway compromise. Urine drug screen positive for amphetamines and cocaine. Original ABGs demonstrated pH of 7.04, PCO2 of 62.5, PO2 of 236.1, bicarb of 16.9. These ABGs were taken on the ventilator on AC of 14 at 60%.        ER Course  Upon presentation to the ER, routine labwork was performed which revealed CO2 of 19, creatinine of 1.7, anion gap 26, lactic of 11.7, glucose of 233, alk phos of 143, ALT of 469, AST of 62, ammonia of 72, CBC of 12.0, hemoglobin of 17.3 urine drug screen positive for amphetamines and cocaine. Imaging results are as outlined below in the Imaging section of this note. EKG revealed normal sinus rhythm with prolonged QTC. Upon arrival to the ER, patient was hypothermic at 92.1 degrees, 103/69.   The patient received 2L NS, Lovenox, Ativan, Narcan in the emergency room and was admitted to ICU activity:     Days per week: Not on file     Minutes per session: Not on file    Stress: Not on file   Relationships    Social connections:     Talks on phone: Not on file     Gets together: Not on file     Attends Latter-day service: Not on file     Active member of club or organization: Not on file     Attends meetings of clubs or organizations: Not on file     Relationship status: Not on file    Intimate partner violence:     Fear of current or ex partner: Not on file     Emotionally abused: Not on file     Physically abused: Not on file     Forced sexual activity: Not on file   Other Topics Concern    Not on file   Social History Narrative    Not on file       Review of Systems  Unable to be obtained secondary to condition    Physical Examination  Vitals:  BP 99/65   Pulse 98   Temp 96.1 °F (35.6 °C) (Bladder)   Resp 20   Ht 5' 11\" (1.803 m)   Wt 161 lb 8 oz (73.3 kg)   SpO2 100%   BMI 22.52 kg/m²   General Appearance:  Intubated, sedated  HEENT:  NCAT; PERRL; conjunctiva pink, sclera clear  Neck:  no adenopathy, bruit, JVD, tenderness, masses, or nodules; supple, symmetrical, trachea midline, thyroid not enlarged  Lung:  clear to auscultation bilaterally; no use of accessory muscles; no rhonchi, rales, or crackles  Heart:  regular rate and regular rhythm without murmur, rub, or gallop  Abdomen:  soft, nontender, nondistended; normoactive bowel sounds; no organomegaly  Extremities:  extremities normal, atraumatic, no cyanosis or edema  Musculokeletal:  no joint swelling, no muscle tenderness. ROM normal in all joints of extremities.    Neurologic: intubated, sedated, moves all 4 extremities        Laboratory Data  Recent Results (from the past 24 hour(s))   Arterial Blood Gas, Respiratory Only    Collection Time: 06/12/19  7:12 AM   Result Value Ref Range    Source: Arterial     FIO2 Arterial 60.0     pH, Blood Gas 7.040 (LL) 7.350 - 7.450    pCO2, Arterial 62.5 (H) 35.0 - 45.0 mmHg    pO2, Arterial 236.1 (H) 80.0 - 100.0 mmHg    HCO3, Arterial 16.9 (L) 22.0 - 26.0 mmol/L    B.E. -14.9 (L) -3.0 - 3.0 mmol/L    O2 Sat 99.5 (H) 92.0 - 98.5 %          DEVICE 15,065,521,400,933     Critical Notification Yes     Mode AC     Tidal Volume 450 mL    Positive End EXP Press 5 cmH2O    Respiratory Rate 14 b/min    Delivery Systems AdultVent    CBC Auto Differential    Collection Time: 06/12/19  7:17 AM   Result Value Ref Range    WBC 12.0 (H) 4.5 - 11.5 E9/L    RBC 5.22 3.80 - 5.80 E12/L    Hemoglobin 17.3 (H) 12.5 - 16.5 g/dL    Hematocrit 52.2 37.0 - 54.0 %    .0 (H) 80.0 - 99.9 fL    MCH 33.1 26.0 - 35.0 pg    MCHC 33.1 32.0 - 34.5 %    RDW 12.1 11.5 - 15.0 fL    Platelets 140 539 - 709 E9/L    MPV 10.2 7.0 - 12.0 fL    Neutrophils % 83.0 (H) 43.0 - 80.0 %    Lymphocytes % 15.0 (L) 20.0 - 42.0 %    Monocytes % 1.0 (L) 2.0 - 12.0 %    Eosinophils % 0.0 0.0 - 6.0 %    Basophils % 0.0 0.0 - 2.0 %    Neutrophils # 10.08 (H) 1.80 - 7.30 E9/L    Lymphocytes # 1.80 1.50 - 4.00 E9/L    Monocytes # 0.12 0.10 - 0.95 E9/L    Eosinophils # 0.00 (L) 0.05 - 0.50 E9/L    Basophils # 0.00 0.00 - 0.20 E9/L    Metamyelocytes Relative 1.0 0.0 - 1.0 %    Poikilocytes 1+     [place] Cells 1+    Comprehensive Metabolic Panel w/ Reflex to MG    Collection Time: 06/12/19  7:17 AM   Result Value Ref Range    Sodium 138 132 - 146 mmol/L    Potassium reflex Magnesium 4.4 3.5 - 5.0 mmol/L    Chloride 93 (L) 98 - 107 mmol/L    CO2 19 (L) 22 - 29 mmol/L    Anion Gap 26 (H) 7 - 16 mmol/L    Glucose 233 (H) 74 - 99 mg/dL    BUN 20 6 - 20 mg/dL    CREATININE 1.7 (H) 0.7 - 1.2 mg/dL    GFR Non-African American 46 >=60 mL/min/1.73    GFR African American 55     Calcium 8.5 (L) 8.6 - 10.2 mg/dL    Total Protein 7.9 6.4 - 8.3 g/dL    Alb 4.4 3.5 - 5.2 g/dL    Total Bilirubin 0.7 0.0 - 1.2 mg/dL    Alkaline Phosphatase 143 (H) 40 - 129 U/L     (H) 0 - 40 U/L     (H) 0 - 39 U/L   Urinalysis, reflex to microscopic AM   Result Value Ref Range    Ventricular Rate 86 BPM    Atrial Rate 86 BPM    P-R Interval 140 ms    QRS Duration 100 ms    Q-T Interval 422 ms    QTc Calculation (Bazett) 504 ms    P Axis 78 degrees    R Axis 82 degrees    T Axis 68 degrees   Lactic Acid, Plasma    Collection Time: 06/12/19  9:12 AM   Result Value Ref Range    Lactic Acid 7.4 (HH) 0.5 - 2.2 mmol/L   Blood Gas, Arterial    Collection Time: 06/12/19  9:31 AM   Result Value Ref Range    Date Analyzed [id number]     Time Analyzed 0931     Source: Blood Arterial     pH, Blood Gas 7.198 (LL) 7.350 - 7.450    PCO2 38.0 35.0 - 45.0 mmHg    PO2 153.4 (H) 60.0 - 100.0 mmHg    HCO3 14.5 (L) 22.0 - 26.0 mmol/L    B.E. -12.8 (L) -3.0 - 3.0 mmol/L    O2 Sat 98.1 92.0 - 98.5 %    PO2/FIO2 3.83 mmHg/%    RI(T) 0.51     O2Hb 94.6 94.0 - 97.0 %    COHb 3.1 (H) 0.0 - 1.5 %    MetHb 0.5 0.0 - 1.5 %    O2 Content 20.9 mL/dL    HHb 1.8 0.0 - 5.0 %    tHb (est) 15.5 11.5 - 16.5 g/dL    Mode AC     FIO2 40.0 %    Rr Mechanical 18.0 b/min    Vt Mechanical 450.0 mL    Peep/Cpap 5.0 cmH2O    Comment with readback     Date Of Collection      Time Collected      Pt Temp 37.0 C     ID [id number]     Lab [id number]     Critical(s) Notified Called to ETIENNE Sorensen    Osmolality, Serum    Collection Time: 06/12/19 10:07 AM   Result Value Ref Range    Osmolality 297 285 - 310 mOsm/Kg       Imaging  Ct Head Wo Contrast    Result Date: 6/12/2019  Location: 200 Indication: Unresponsive Comparison: None available. Technique: Multidetector CT imaging was obtained from the skull base to vertex without the administration of intravenous contrast. Coronal and sagittal reformatted images were obtained. Automated dose exposure control was used for this exam. FINDINGS: Ventricles and sulci normal in size and configuration. No extra-axial collection. No acute intracranial hemorrhage. No cerebral edema or mass effect. No CT evidence of acute, large territorial infarction.  There is moderate left position. Xr Chest Abdomen Ng Placement    Result Date: 6/12/2019  Reading location: 200 Indication: Intubation and OG tube placement Comparison: None available Technique: Portable supine radiographs of the chest and upper abdomen were obtained. Findings: Initial radiograph demonstrates a endotracheal tube tip projecting within the upper trachea approximately 14 cm above the level the kareen. The OG tube was initially coiled within the pharynx, however subsequent radiograph demonstrates distal tip within the stomach. The cardiomediastinal silhouette is normal in size and contours. Bilateral lungs and costophrenic angles are clear. There is no evidence of pneumothorax. 1. Endotracheal tube tip projects within the upper trachea approximately 14 cm above the level the kareen. Advancement is recommended. 2. OG tube tip terminates within the stomach. 3. No acute cardiopulmonary disease. Assessment and Plan  Patient is a 41 y.o. male who presented unresponsive. The active problem list is as follows:      · Unresponsive ? due to possible drug overdose vs sepsis possible strep infection  · Recent diagnosis of strep throat, given amoxil  · Acute hypoxic respiratory failure  · HAGMA with lactic acidosis  · UDS positive for amphetamines and cocaine  · Elevated transaminases  · AISHA unknown baseline creatinine        -Check strep swab, ASO, procalcitonin. Will continue rocephin  -Check repeat ABG will make ventilator adjustments  -Trend lactic acid  -HIV and hepatitis panel  -Check CK  -NS @ 125cc/hr for now    DVT prophylaxis: Lovenox  GI prophylaxis: protonix  Lines: Fem CVC    The plan was discussed with Dr. Marcello Boast. 2 Rehab DO [place],  PGYII    Agree with Dr. Hugo Melo note and assessment. Patient seen and examined. All laboratory data and radiographic imaging reviewed by me. The patient is a 30-year-old male who came to his girlfriends house in a stuporous condition.   She he had been recently seen at the [facility] and given treatment (amoxicillin) for a presumed strep throat. However, he became more somnolent and eventually cyanotic and was taken to the emergency room where he was given ketamine and vecuronium intubated and transferred to the intensive care unit. His lactic acid was elevated at 11.7 but has since come down to 2.5. Unfortunately, CK was not done then but is pending now. It is not known whether he had a seizure or not. However, his urine tox screen showed a small amount of alcohol and was positive for cocaine and methamphetamine. His chest x-ray does not show any evidence at this point of aspiration. However, he does have elevated liver enzymes and this is of some concern and a presumed drug user. Physical examination is essentially benign at this time. There are a few low pitched wheezes anteriorly but no other significant findings are present. Neurologic exam, to the extent that it can be assessed, does not appear to be abnormal.  However, he is currently on propofol so this will need reassessment. For now, it is probably best to keep him sedated and therefore intubated, probably until the morning at which time his neurologic status can be reassessed and we will repeat his chest x-ray labs and other studies. If all is stable, we will proceed toward eventual extubation. He will be maintained on IV fluids DVT prophylaxis ulcer prophylaxis and will continue ceftriaxone for right now. ICU Staff Physician note of personal involvement in Care  As the attending physician, I certify that I personally reviewed the patients history and personnally examined the patient to confirm the physical findings described above,  And that I reviewed the relevant imaging studies and available reports. I also discussed the differential diagnosis and all of the proposed management plans with the patient and individuals accompanying the patient to this visit.   They had the opportunity to ask questions about the proposed management plans and to have those questions answered. This patient has a high probability of sudden, clinically significant deterioration, which requires the highest level of physician preparedness to intervene urgently. I managed/supervised life or organ supporting interventions that required frequent physician assessment. I devoted my full attention to the direct care of this patient for the amount of time indicated below. Time I spent with the family or surrogate(s) is included only if the patient was incapable of providing the necessary information or participating in medical decisions - Time devoted to teaching and to any procedures I billed separately is not included.      Critical Care Time: 40 minutes      12:22 PM  6/12/2019

## 2019-06-12 NOTE — ED NOTES
After giving report, returned to patients bedside to find medication pump was shut off. When girlfriend was asked what happened, she stated \"they were beeping so I hit the channel off button and they stopped. \"  Medication restarted. Girlfriend told not to touch equipment.        Herbie Nina RN  06/12/19 [id number]

## 2019-06-12 NOTE — ED NOTES
Endotracheal tube advanced from 21cm at the lip to 26cm at the lip. Portable chest x-ray ordered to confirm placement. Awaiting x-ray before transport to cat scan.       Feliberto [facility]Le[facility]  06/12/19 0646

## 2019-06-12 NOTE — ED NOTES
ICU called, nurse report given to [facility]. Awaiting respiratory to transport pt.       Manny Chao RN  06/12/19 [id number]

## 2019-06-12 NOTE — ED NOTES
Respiratory paged to advance endotracheal tube and to assist with transporting patient over to cat scan.       Abi Tomlinson RN  06/12/19 5220

## 2019-06-12 NOTE — ED NOTES
Patient arrived via [facility] unresponsive with snoring respirations. Was found unresponsive on the living room floor by the girlfriend unresponsive. Patient remains unresponsive at this time with snoring respirations. 2mg IVP Narcan given on arrival to ED. 2mg of Narcan was given nasally and 2 mg of Narcan given IVP by [facility].      Jose J Blanc RN  06/12/19 Ed  86., RN  06/12/19 7408

## 2019-06-12 NOTE — CARE COORDINATION
Emergency Department Social Work Assessment:    Pt presents to the ED due to unresponsiveness. Pt is currently intubated. UDS positive for amphetamine and cocaine- Sheree Neighbours from [facility] following and will meet with pt closer to discharge to provide support and community resources. Assigned SW to follow.     Electronically signed by CHRIS Lancaster, AIMEE on 6/12/2019 at 10:15 AM

## 2019-06-12 NOTE — PROCEDURES
Central Line Placement Procedure Note    Indication: vascular access and need for frequent blood draws    Consent: Unable to be obtained due to the emergent nature of this procedure. Procedure: The patient was positioned appropriately and the skin over the right femoral vein was prepped with Chloraprep and draped in a sterile fashion. Local anesthesia was obtained by infiltration using 1% Lidocaine without epinephrine. A large bore needle was used to identify the vein. A guide wire was then inserted into the vein through the needle. A triple lumen catheter was then inserted into the vessel over the guide wire using the Seldinger technique. All ports showed good, free flowing blood return and were flushed with saline solution. The catheter was then securely fastened to the skin with sutures and covered with an antibiotic patch and a sterile dressing. A post procedure X-ray was not indicated. This procedure was performed using sterile ultrasound guidance. The patient tolerated the procedure well.     Complications: None    Performed by Dr. Case Joseph PGY 4  Attending Physician: Dr. Marissa Reeves

## 2019-06-13 ENCOUNTER — APPOINTMENT (OUTPATIENT)
Dept: GENERAL RADIOLOGY | Age: 39
DRG: 816 | End: 2019-06-13
Payer: MEDICARE

## 2019-06-13 ENCOUNTER — APPOINTMENT (OUTPATIENT)
Dept: ULTRASOUND IMAGING | Age: 39
DRG: 816 | End: 2019-06-13
Payer: MEDICARE

## 2019-06-13 LAB
ALBUMIN SERPL-MCNC: 3 G/DL (ref 3.5–5.2)
ALP BLD-CCNC: 79 U/L (ref 40–129)
ALT SERPL-CCNC: 411 U/L (ref 0–40)
ANION GAP SERPL CALCULATED.3IONS-SCNC: 8 MMOL/L (ref 7–16)
AST SERPL-CCNC: 398 U/L (ref 0–39)
B.E.: -0.2 MMOL/L (ref -3–3)
BILIRUB SERPL-MCNC: 0.4 MG/DL (ref 0–1.2)
BUN BLDV-MCNC: 24 MG/DL (ref 6–20)
CALCIUM SERPL-MCNC: 7.5 MG/DL (ref 8.6–10.2)
CHLORIDE BLD-SCNC: 108 MMOL/L (ref 98–107)
CHOLESTEROL, TOTAL: 80 MG/DL (ref 0–199)
CO2: 26 MMOL/L (ref 22–29)
COHB: 0.6 % (ref 0–1.5)
CREAT SERPL-MCNC: 1 MG/DL (ref 0.7–1.2)
CRITICAL: ABNORMAL
DATE ANALYZED: ABNORMAL
DATE OF COLLECTION: ABNORMAL
FIO2: 30 %
GFR AFRICAN AMERICAN: >60
GFR NON-AFRICAN AMERICAN: >60 ML/MIN/1.73
GLUCOSE BLD-MCNC: 96 MG/DL (ref 74–99)
HAV IGM SER IA-ACNC: ABNORMAL
HBA1C MFR BLD: 5.1 % (ref 4–5.6)
HCO3: 25.5 MMOL/L (ref 22–26)
HCT VFR BLD CALC: 38.4 % (ref 37–54)
HDLC SERPL-MCNC: 36 MG/DL
HEMOGLOBIN: 13.4 G/DL (ref 12.5–16.5)
HEPATITIS B CORE IGM ANTIBODY: ABNORMAL
HEPATITIS B SURFACE ANTIGEN INTERPRETATION: ABNORMAL
HEPATITIS C ANTIBODY INTERPRETATION: REACTIVE
HHB: 9.2 % (ref 0–5)
HIV-1 AND HIV-2 ANTIBODIES: NORMAL
INR BLD: 1.1
L. PNEUMOPHILA SEROGP 1 UR AG: NORMAL
LAB: ABNORMAL
LDL CHOLESTEROL CALCULATED: 31 MG/DL (ref 0–99)
Lab: ABNORMAL
MCH RBC QN AUTO: 33.5 PG (ref 26–35)
MCHC RBC AUTO-ENTMCNC: 34.9 % (ref 32–34.5)
MCV RBC AUTO: 96 FL (ref 80–99.9)
METHB: 0.4 % (ref 0–1.5)
MODE: AC
O2 CONTENT: 18 ML/DL
O2 SATURATION: 90.7 % (ref 92–98.5)
O2HB: 89.8 % (ref 94–97)
OPERATOR ID: 901
PATIENT TEMP: 37
PCO2: 45.4 MMHG (ref 35–45)
PDW BLD-RTO: 12.5 FL (ref 11.5–15)
PEEP/CPAP: 5 CMH2O
PFO2: 1.98 MMHG/%
PH BLOOD GAS: 7.37 (ref 7.35–7.45)
PLATELET # BLD: 136 E9/L (ref 130–450)
PMV BLD AUTO: 10.2 FL (ref 7–12)
PO2: 59.3 MMHG (ref 60–100)
POTASSIUM SERPL-SCNC: 3.7 MMOL/L (ref 3.5–5)
PROTHROMBIN TIME: 12.3 SEC (ref 9.3–12.4)
RBC # BLD: 4 E12/L (ref 3.8–5.8)
RR MECHANICAL: 14 B/MIN
SODIUM BLD-SCNC: 142 MMOL/L (ref 132–146)
SOURCE, BLOOD GAS: ABNORMAL
STREP PNEUMONIAE ANTIGEN, URINE: NORMAL
THB: 14.3 G/DL (ref 11.5–16.5)
TIME ANALYZED: 520
TOTAL CK: 377 U/L (ref 20–200)
TOTAL PROTEIN: 5.3 G/DL (ref 6.4–8.3)
TRIGL SERPL-MCNC: 65 MG/DL (ref 0–149)
TSH SERPL DL<=0.05 MIU/L-ACNC: 1.24 UIU/ML (ref 0.27–4.2)
VLDLC SERPL CALC-MCNC: 13 MG/DL
VT MECHANICAL: 450 ML
WBC # BLD: 12.9 E9/L (ref 4.5–11.5)

## 2019-06-13 PROCEDURE — 85610 PROTHROMBIN TIME: CPT

## 2019-06-13 PROCEDURE — 36600 WITHDRAWAL OF ARTERIAL BLOOD: CPT

## 2019-06-13 PROCEDURE — 1200000000 HC SEMI PRIVATE

## 2019-06-13 PROCEDURE — 36592 COLLECT BLOOD FROM PICC: CPT

## 2019-06-13 PROCEDURE — 6360000002 HC RX W HCPCS: Performed by: INTERNAL MEDICINE

## 2019-06-13 PROCEDURE — 94640 AIRWAY INHALATION TREATMENT: CPT

## 2019-06-13 PROCEDURE — 85027 COMPLETE CBC AUTOMATED: CPT

## 2019-06-13 PROCEDURE — 83036 HEMOGLOBIN GLYCOSYLATED A1C: CPT

## 2019-06-13 PROCEDURE — 2580000003 HC RX 258: Performed by: INTERNAL MEDICINE

## 2019-06-13 PROCEDURE — 84443 ASSAY THYROID STIM HORMONE: CPT

## 2019-06-13 PROCEDURE — 94003 VENT MGMT INPAT SUBQ DAY: CPT

## 2019-06-13 PROCEDURE — 93976 VASCULAR STUDY: CPT

## 2019-06-13 PROCEDURE — 2700000000 HC OXYGEN THERAPY PER DAY

## 2019-06-13 PROCEDURE — 80061 LIPID PANEL: CPT

## 2019-06-13 PROCEDURE — 76705 ECHO EXAM OF ABDOMEN: CPT

## 2019-06-13 PROCEDURE — C9113 INJ PANTOPRAZOLE SODIUM, VIA: HCPCS | Performed by: INTERNAL MEDICINE

## 2019-06-13 PROCEDURE — 36415 COLL VENOUS BLD VENIPUNCTURE: CPT

## 2019-06-13 PROCEDURE — 82805 BLOOD GASES W/O2 SATURATION: CPT

## 2019-06-13 PROCEDURE — 80053 COMPREHEN METABOLIC PANEL: CPT

## 2019-06-13 PROCEDURE — 82550 ASSAY OF CK (CPK): CPT

## 2019-06-13 PROCEDURE — 71045 X-RAY EXAM CHEST 1 VIEW: CPT

## 2019-06-13 PROCEDURE — 6370000000 HC RX 637 (ALT 250 FOR IP): Performed by: INTERNAL MEDICINE

## 2019-06-13 RX ORDER — IBUPROFEN 400 MG/1
400 TABLET ORAL EVERY 6 HOURS PRN
Status: DISCONTINUED | OUTPATIENT
Start: 2019-06-13 | End: 2019-06-16 | Stop reason: HOSPADM

## 2019-06-13 RX ORDER — IPRATROPIUM BROMIDE AND ALBUTEROL SULFATE 2.5; .5 MG/3ML; MG/3ML
1 SOLUTION RESPIRATORY (INHALATION) EVERY 4 HOURS PRN
Status: DISCONTINUED | OUTPATIENT
Start: 2019-06-13 | End: 2019-06-16 | Stop reason: HOSPADM

## 2019-06-13 RX ADMIN — ENOXAPARIN SODIUM 40 MG: 40 INJECTION SUBCUTANEOUS at 11:30

## 2019-06-13 RX ADMIN — SODIUM CHLORIDE, POTASSIUM CHLORIDE, SODIUM LACTATE AND CALCIUM CHLORIDE: 600; 310; 30; 20 INJECTION, SOLUTION INTRAVENOUS at 21:42

## 2019-06-13 RX ADMIN — SODIUM CHLORIDE, POTASSIUM CHLORIDE, SODIUM LACTATE AND CALCIUM CHLORIDE: 600; 310; 30; 20 INJECTION, SOLUTION INTRAVENOUS at 03:44

## 2019-06-13 RX ADMIN — WATER 2 G: 1 INJECTION INTRAMUSCULAR; INTRAVENOUS; SUBCUTANEOUS at 09:02

## 2019-06-13 RX ADMIN — Medication 10 ML: at 21:37

## 2019-06-13 RX ADMIN — Medication 10 ML: at 04:08

## 2019-06-13 RX ADMIN — PANTOPRAZOLE SODIUM 40 MG: 40 INJECTION, POWDER, FOR SOLUTION INTRAVENOUS at 09:01

## 2019-06-13 RX ADMIN — Medication 10 ML: at 03:44

## 2019-06-13 RX ADMIN — Medication 10 ML: at 09:02

## 2019-06-13 RX ADMIN — IBUPROFEN 400 MG: 400 TABLET ORAL at 21:42

## 2019-06-13 RX ADMIN — IPRATROPIUM BROMIDE AND ALBUTEROL SULFATE 1 AMPULE: .5; 3 SOLUTION RESPIRATORY (INHALATION) at 07:07

## 2019-06-13 RX ADMIN — PROPOFOL 40 MCG/KG/MIN: 10 INJECTION, EMULSION INTRAVENOUS at 04:08

## 2019-06-13 RX ADMIN — IPRATROPIUM BROMIDE AND ALBUTEROL SULFATE 1 AMPULE: .5; 3 SOLUTION RESPIRATORY (INHALATION) at 10:41

## 2019-06-13 ASSESSMENT — PAIN SCALES - GENERAL
PAINLEVEL_OUTOF10: 0
PAINLEVEL_OUTOF10: 8
PAINLEVEL_OUTOF10: 0
PAINLEVEL_OUTOF10: 3

## 2019-06-13 ASSESSMENT — PAIN DESCRIPTION - PAIN TYPE
TYPE: CHRONIC PAIN
TYPE: ACUTE PAIN

## 2019-06-13 ASSESSMENT — PAIN DESCRIPTION - DESCRIPTORS
DESCRIPTORS: ACHING;DISCOMFORT;NAGGING
DESCRIPTORS: ACHING;CONSTANT

## 2019-06-13 ASSESSMENT — PAIN DESCRIPTION - ORIENTATION: ORIENTATION: LOWER

## 2019-06-13 ASSESSMENT — PULMONARY FUNCTION TESTS
PIF_VALUE: 27
PIF_VALUE: 26
PIF_VALUE: 15

## 2019-06-13 ASSESSMENT — PAIN DESCRIPTION - LOCATION
LOCATION: BACK;CHEST
LOCATION: BACK

## 2019-06-13 ASSESSMENT — PAIN DESCRIPTION - FREQUENCY: FREQUENCY: CONTINUOUS

## 2019-06-13 ASSESSMENT — PAIN DESCRIPTION - ONSET: ONSET: ON-GOING

## 2019-06-13 ASSESSMENT — PAIN - FUNCTIONAL ASSESSMENT: PAIN_FUNCTIONAL_ASSESSMENT: ACTIVITIES ARE NOT PREVENTED

## 2019-06-13 NOTE — CONSULTS
Katia Perkins M.D. [facility]  Dr. Darryle Rand, M.D.,  Dr. Beata Duron M.D.,  ETIENNE WilkinsO.,  Dr Judith Stephens D.O. ,  Dr Manpreet Solomon M.D. Phi Antonio  41 y.o.  male      Re: newly diagnosed hepatitis C  Requesting physician: Jana Etienne  Date:12:33 PM 6/13/2019          HPI: 74-year-old male patient presented initially to the hospital on 12 June with respiratory distress and drug overdose. He apparently was found unresponsive and brought to the hospital.  Patient was subsequently intubated. His urine drug screen tested positive for amphetamines and cocaine. Patient was extubated earlier today. Patient was found to have positive hepatitis C antibody. She currently remains somewhat somnolent but denies any significant abdominal pain though reports that occasionally may have abdominal pain. Patient denies any nausea or vomiting at current time. He is unsure of the quality and frequency of his bowel movement. Patient reports no previous history of hepatitis or jaundice. Patient admits to drug abuse however denies IV drug abuse. Patient admits to multiple tattoos. On presentation he was noted to have elevated AST of 682 which has decreased to 398 today. His ALT also has decreased to 411 today. Bilirubin remain nonelevated and today is 0.4. Hepatitis serology was positive for hepatitis C antibody.   Ultrasound has been ordered on presentation however patient apparently was too combative to complete the test.      Information sources:   -Patient  -medical record  -health care team    PMHx:  Past Medical History:   Diagnosis Date    IV drug abuse ([facility] [address]     Spinal injury     thoracic and lumbar areas    Splenic laceration     Ulcer     stomach       PSHx:  Past Surgical History:   Procedure Laterality Date    APPENDECTOMY  [id number]    FRACTURE SURGERY      asiya in right femur       Meds:  No current facility-administered medications on file prior to encounter. Current Outpatient Medications on File Prior to Encounter   Medication Sig Dispense Refill    ibuprofen (ADVIL;MOTRIN) 200 MG tablet Take 800 mg by mouth every 6 hours as needed for Pain           SocHx:  Social History     Socioeconomic History    Marital status: Single     Spouse name: Not on file    Number of children: Not on file    Years of education: Not on file    Highest education level: Not on file   Occupational History    Not on file   Social Needs    Financial resource strain: Not on file    Food insecurity:     Worry: Not on file     Inability: Not on file    Transportation needs:     Medical: Not on file     Non-medical: Not on file   Tobacco Use    Smoking status: Current Every Day Smoker     Packs/day: 1.00     Types: Cigarettes    Smokeless tobacco: Never Used   Substance and Sexual Activity    Alcohol use: Yes     Comment: Rarely    Drug use:  Yes    Sexual activity: Yes   Lifestyle    Physical activity:     Days per week: Not on file     Minutes per session: Not on file    Stress: Not on file   Relationships    Social connections:     Talks on phone: Not on file     Gets together: Not on file     Attends Gnosticism service: Not on file     Active member of club or organization: Not on file     Attends meetings of clubs or organizations: Not on file     Relationship status: Not on file    Intimate partner violence:     Fear of current or ex partner: Not on file     Emotionally abused: Not on file     Physically abused: Not on file     Forced sexual activity: Not on file   Other Topics Concern    Not on file   Social History Narrative    Not on file       FamHx:  Family History   Problem Relation Age of Onset    Arthritis Mother     Alcohol Abuse Father     Liver Disease Father        Allergy:  Allergies   Allergen Reactions    Acetaminophen      Stomach bleeding         ROS: As described in the HPI and in addition is negative upon detailed review of systems or unobtainable unless otherwise stated in this dictation. PE:  /73   Pulse 79   Temp 98.2 °F (36.8 °C) (Core)   Resp 15   Ht 5' 11\" (1.803 m)   Wt 163 lb 8 oz (74.2 kg)   SpO2 98%   BMI 22.80 kg/m²     Gen.: NAD/ male. Somnolent  Head: Atraumatic/normocephalic  Eyes: EOMI/anicteric sclerae  ENT: Moist oromucosa/no discharge no serious  Neck: Supple/trachea midline  Chest: Symmetrical excursion/CTA B  Cor: RRR/S1-S2 without gallop  Abd.: Soft/NT/ND  Extr.:  No peripheral edema  Muscles: Appropriate for age and condition not able  Skin: Warm and dry/anicteric. Multiple tattoos      DATA:     Lab Results   Component Value Date    WBC 12.9 06/13/2019    RBC 4.00 06/13/2019    HGB 13.4 06/13/2019    HCT 38.4 06/13/2019    MCV 96.0 06/13/2019    MCH 33.5 06/13/2019    MCHC 34.9 06/13/2019    RDW 12.5 06/13/2019     06/13/2019    MPV 10.2 06/13/2019     Lab Results   Component Value Date     06/13/2019    K 3.7 06/13/2019    K 4.4 06/12/2019     06/13/2019    CO2 26 06/13/2019    BUN 24 06/13/2019    CREATININE 1.0 06/13/2019    CALCIUM 7.5 06/13/2019    PROT 5.3 06/13/2019    LABALBU 3.0 06/13/2019    BILITOT 0.4 06/13/2019    ALKPHOS 79 06/13/2019     06/13/2019     06/13/2019     Sinus rhythm noted on monitor review      ASSESSMENT/PLAN:  1. Hepatitis  -Hepatitis C antibody positive  -Possibly additional liver injury from hypotension and cocaine -questionable component of alcoholic hepatitis  -Unobstructive liver profile  -Obtain liver imaging.  -Obtain PT/INR to evaluate liver synthetic function and calculate DF  -Patient advised to abstain from alcohol and illicit drugs    2. Respiratory failure/overdose  -Per admitting/CCM    3.  Substance abuse  -Patient advised to quit    Thank you for the opportunity to see this patient in consultation    NOTE:  This report was transcribed using voice recognition software.   Every effort was made to ensure accuracy; however, inadvertent computerized transcription errors may be present.     Clarisa Bazzi MD  6/13/2019  12:33 PM

## 2019-06-13 NOTE — PROGRESS NOTES
Final     CREATININE   Date Value Ref Range Status   06/13/2019 1.0 0.7 - 1.2 mg/dL Final   06/12/2019 1.1 0.7 - 1.2 mg/dL Final   06/12/2019 1.1 0.7 - 1.2 mg/dL Final     Glucose   Date Value Ref Range Status   06/13/2019 96 74 - 99 mg/dL Final   06/12/2019 102 (H) 74 - 99 mg/dL Final   06/12/2019 108 (H) 74 - 99 mg/dL Final     Calcium   Date Value Ref Range Status   06/13/2019 7.5 (L) 8.6 - 10.2 mg/dL Final   06/12/2019 7.1 (L) 8.6 - 10.2 mg/dL Final   06/12/2019 7.4 (L) 8.6 - 10.2 mg/dL Final     Total Protein   Date Value Ref Range Status   06/13/2019 5.3 (L) 6.4 - 8.3 g/dL Final   06/12/2019 5.4 (L) 6.4 - 8.3 g/dL Final   06/12/2019 7.9 6.4 - 8.3 g/dL Final     Alb   Date Value Ref Range Status   06/13/2019 3.0 (L) 3.5 - 5.2 g/dL Final   06/12/2019 3.1 (L) 3.5 - 5.2 g/dL Final   06/12/2019 4.4 3.5 - 5.2 g/dL Final     Total Bilirubin   Date Value Ref Range Status   06/13/2019 0.4 0.0 - 1.2 mg/dL Final   06/12/2019 0.4 0.0 - 1.2 mg/dL Final   06/12/2019 0.7 0.0 - 1.2 mg/dL Final     Alkaline Phosphatase   Date Value Ref Range Status   06/13/2019 79 40 - 129 U/L Final   06/12/2019 87 40 - 129 U/L Final   06/12/2019 143 (H) 40 - 129 U/L Final     AST   Date Value Ref Range Status   06/13/2019 398 (H) 0 - 39 U/L Final   06/12/2019 484 (H) 0 - 39 U/L Final   06/12/2019 682 (H) 0 - 39 U/L Final     Comment:     Specimen is slightly Hemolyzed. Result may be artificially increased. ALT   Date Value Ref Range Status   06/13/2019 411 (H) 0 - 40 U/L Final   06/12/2019 407 (H) 0 - 40 U/L Final   06/12/2019 469 (H) 0 - 40 U/L Final     GFR Non-   Date Value Ref Range Status   06/13/2019 >60 >=60 mL/min/1.73 Final     Comment:     Chronic Kidney Disease: less than 60 ml/min/1.73 sq.m. Kidney Failure: less than 15 ml/min/1.73 sq.m. Results valid for patients 18 years and older. 06/12/2019 >60 >=60 mL/min/1.73 Final     Comment:     Chronic Kidney Disease: less than 60 ml/min/1.73 sq.m. Precedex, in sequence  2. Respiratory parameters including R SBI, spontaneous breathing trial  3. Hope to extubate    ATTESTATION:  ICU Staff Physician note of personal involvement in Care  As the attending physician, I certify that I personally reviewed the patients history and personally examined the patient to confirm the physical findings described above,  And that I reviewed the relevant imaging studies and available reports. I also discussed the differential diagnosis and all of the proposed management plans with the patient and individuals accompanying the patient to this visit. They had the opportunity to ask questions about the proposed management plans and to have those questions answered. This patient has a high probability of sudden, clinically significant deterioration, which requires the highest level of physician preparedness to intervene urgently. I managed/supervised life or organ supporting interventions that required frequent physician assessment. I devoted my full attention to the direct care of this patient for the amount of time indicated below. Time I spent with the family or surrogate(s) is included only if the patient was incapable of providing the necessary information or participating in medical decisions - Time devoted to teaching and to any procedures I billed separately is not included.     CRITICAL CARE TIME:  35 minutes    Electronically signed by Cesilia Carrizales MD on 6/13/2019 at 8:44 AM

## 2019-06-13 NOTE — PROGRESS NOTES
Spontaneous Parameters performed    NIF = -[id number]  RSBI = 24 br/min/L      Performed by Mayelin Duffy

## 2019-06-13 NOTE — PROGRESS NOTES
[address] [facility]  Internal Medicine  -Resident Progress Note-    PCP:  Renée Camacho DO  Admitting Physician:  Colin Marquis DO  Consultants:  Critical Care  Chief Complaint:    Chief Complaint   Patient presents with    Altered Mental Status     patient found unresponsive by girlfriend on living room floor       History of Present Illness  Marla Kang was seen and examined at bedside today; mother was present for the examination. No acute overnight events were noted. He remains intubated. Plans are for extubation today. I did speak with sister yesterday, and she states that he did do cocaine, ecstasy, and drink a lot of alcohol prior to this event. Review of Systems  Unable to obtain    Physical Examination  Vitals:  BP (!) 91/54   Pulse 79   Temp 97.9 °F (36.6 °C) (Core)   Resp 15   Ht 5' 11\" (1.803 m)   Wt 163 lb 8 oz (74.2 kg)   SpO2 98%   BMI 22.80 kg/m²   General Appearance:  Intubated, sedated  HEENT:  NCAT; PERRL; conjunctiva pink, sclera clear  Neck:  no adenopathy, bruit, JVD, tenderness, masses, or nodules; supple, symmetrical, trachea midline, thyroid not enlarged  Lung:  clear to auscultation bilaterally; no use of accessory muscles; no rhonchi, rales, or crackles  Heart:  regular rate and regular rhythm without murmur, rub, or gallop  Abdomen:  soft, nontender, nondistended; normoactive bowel sounds; no organomegaly  Extremities:  extremities normal, atraumatic, no cyanosis or edema  Musculokeletal:  no joint swelling, no muscle tenderness. ROM normal in all joints of extremities.    Neurologic: intubated, sedated, moves all 4 extremities        Medications  Scheduled Meds    sodium chloride flush  10 mL Intravenous 2 times per day    enoxaparin  40 mg Subcutaneous Daily    pantoprazole  40 mg Intravenous Daily    cefTRIAXone (ROCEPHIN) IV  2 g Intravenous Q24H    ipratropium-albuterol  1 ampule Inhalation Q4H While awake     Infusion Meds    propofol 20 mcg/kg/min (06/13/19 6231)    lactated ringers 150 mL/hr at 06/13/19 0344    dexmedetomidine HCl in NaCl 0.2 mcg/kg/hr (06/13/19 0900)     PRN Meds sodium chloride flush    Laboratory Data  Recent Results (from the past 24 hour(s))   Lactic Acid, Plasma    Collection Time: 06/12/19 11:50 AM   Result Value Ref Range    Lactic Acid 2.5 (H) 0.5 - 2.2 mmol/L   CK    Collection Time: 06/12/19 11:50 AM   Result Value Ref Range    Total  20 - 200 U/L   Magnesium    Collection Time: 06/12/19 11:50 AM   Result Value Ref Range    Magnesium 2.0 1.6 - 2.6 mg/dL   Phosphorus    Collection Time: 06/12/19 11:50 AM   Result Value Ref Range    Phosphorus 2.7 2.5 - 4.5 mg/dL   Troponin    Collection Time: 06/12/19 11:50 AM   Result Value Ref Range    Troponin <0.01 0.00 - 0.03 ng/mL   Strep Pneumoniae Antigen    Collection Time: 06/12/19  1:00 PM   Result Value Ref Range    STREP PNEUMONIAE ANTIGEN, URINE       Presumptive NEGATIVE for Pneumococcal pneumonia, suggesting  no current or recent pneumococcal infection. Infection due  to S. pneumoniae cannot be ruled out since the antigen present  in the sample may be below the detection limit of the test.     Blood Gas, Arterial    Collection Time: 06/12/19  1:35 PM   Result Value Ref Range    Date Analyzed [id number]     Time Analyzed 0676     Source: Blood Arterial     pH, Blood Gas 7.403 7.350 - 7.450    PCO2 34.9 (L) 35.0 - 45.0 mmHg    PO2 131.3 (H) 60.0 - 100.0 mmHg    HCO3 21.3 (L) 22.0 - 26.0 mmol/L    B.E. -2.7 -3.0 - 3.0 mmol/L    O2 Sat 98.1 92.0 - 98.5 %    PO2/FIO2 4.38 mmHg/%    RI(T) 0.26     O2Hb 96.4 94.0 - 97.0 %    COHb 1.2 0.0 - 1.5 %    MetHb 0.5 0.0 - 1.5 %    O2 Content 20.6 mL/dL    HHb 1.9 0.0 - 5.0 %    tHb (est) 15.1 11.5 - 16.5 g/dL    Mode AC     FIO2 30.0 %    Rr Mechanical 18.0 b/min    Vt Mechanical 450.0 mL    Peep/Cpap 5.0 cmH2O    Date Of Collection      Time Collected      Pt Temp 37.0 C     ID [id number]     Lab [id number]     Critical(s) Notified .  No Critical Values Basic Metabolic Panel    Collection Time: 06/12/19  4:05 PM   Result Value Ref Range    Sodium 140 132 - 146 mmol/L    Potassium 3.9 3.5 - 5.0 mmol/L    Chloride 106 98 - 107 mmol/L    CO2 23 22 - 29 mmol/L    Anion Gap 11 7 - 16 mmol/L    Glucose 108 (H) 74 - 99 mg/dL    BUN 24 (H) 6 - 20 mg/dL    CREATININE 1.1 0.7 - 1.2 mg/dL    GFR Non-African American >60 >=60 mL/min/1.73    GFR African American >60     Calcium 7.4 (L) 8.6 - 10.2 mg/dL   Strep screen group a throat    Collection Time: 06/12/19  4:09 PM   Result Value Ref Range    Strep Grp A PCR Negative Negative   Lactic Acid, Plasma    Collection Time: 06/12/19  7:45 PM   Result Value Ref Range    Lactic Acid 1.2 0.5 - 2.2 mmol/L   Comprehensive metabolic panel    Collection Time: 06/12/19  7:45 PM   Result Value Ref Range    Sodium 138 132 - 146 mmol/L    Potassium 4.0 3.5 - 5.0 mmol/L    Chloride 105 98 - 107 mmol/L    CO2 24 22 - 29 mmol/L    Anion Gap 9 7 - 16 mmol/L    Glucose 102 (H) 74 - 99 mg/dL    BUN 25 (H) 6 - 20 mg/dL    CREATININE 1.1 0.7 - 1.2 mg/dL    GFR Non-African American >60 >=60 mL/min/1.73    GFR African American >60     Calcium 7.1 (L) 8.6 - 10.2 mg/dL    Total Protein 5.4 (L) 6.4 - 8.3 g/dL    Alb 3.1 (L) 3.5 - 5.2 g/dL    Total Bilirubin 0.4 0.0 - 1.2 mg/dL    Alkaline Phosphatase 87 40 - 129 U/L     (H) 0 - 40 U/L     (H) 0 - 39 U/L   Lipid Panel    Collection Time: 06/13/19  5:00 AM   Result Value Ref Range    Cholesterol, Total 80 0 - 199 mg/dL    Triglycerides 65 0 - 149 mg/dL    HDL 36 >40 mg/dL    LDL Calculated 31 0 - 99 mg/dL    VLDL Cholesterol Calculated 13 mg/dL   TSH without Reflex    Collection Time: 06/13/19  5:00 AM   Result Value Ref Range    TSH 1.240 0.270 - 4.200 uIU/mL   Comprehensive Metabolic Panel    Collection Time: 06/13/19  5:00 AM   Result Value Ref Range    Sodium 142 132 - 146 mmol/L    Potassium 3.7 3.5 - 5.0 mmol/L    Chloride 108 (H) 98 - 107 mmol/L    CO2 26 22 - 29 mmol/L    Anion Gap 8 7 - 16 mmol/L    Glucose 96 74 - 99 mg/dL    BUN 24 (H) 6 - 20 mg/dL    CREATININE 1.0 0.7 - 1.2 mg/dL    GFR Non-African American >60 >=60 mL/min/1.73    GFR African American >60     Calcium 7.5 (L) 8.6 - 10.2 mg/dL    Total Protein 5.3 (L) 6.4 - 8.3 g/dL    Alb 3.0 (L) 3.5 - 5.2 g/dL    Total Bilirubin 0.4 0.0 - 1.2 mg/dL    Alkaline Phosphatase 79 40 - 129 U/L     (H) 0 - 40 U/L     (H) 0 - 39 U/L   CBC    Collection Time: 06/13/19  5:00 AM   Result Value Ref Range    WBC 12.9 (H) 4.5 - 11.5 E9/L    RBC 4.00 3.80 - 5.80 E12/L    Hemoglobin 13.4 12.5 - 16.5 g/dL    Hematocrit 38.4 37.0 - 54.0 %    MCV 96.0 80.0 - 99.9 fL    MCH 33.5 26.0 - 35.0 pg    MCHC 34.9 (H) 32.0 - 34.5 %    RDW 12.5 11.5 - 15.0 fL    Platelets 784 065 - 493 E9/L    MPV 10.2 7.0 - 12.0 fL   CK    Collection Time: 06/13/19  5:00 AM   Result Value Ref Range    Total  (H) 20 - 200 U/L   Blood Gas, Arterial    Collection Time: 06/13/19  5:20 AM   Result Value Ref Range    Date Analyzed [id number]     Time Analyzed 5520     Source: Blood Arterial     pH, Blood Gas 7.367 7.350 - 7.450    PCO2 45.4 (H) 35.0 - 45.0 mmHg    PO2 59.3 (L) 60.0 - 100.0 mmHg    HCO3 25.5 22.0 - 26.0 mmol/L    B.E. -0.2 -3.0 - 3.0 mmol/L    O2 Sat 90.7 (L) 92.0 - 98.5 %    PO2/FIO2 1.98 mmHg/%    O2Hb 89.8 (L) 94.0 - 97.0 %    COHb 0.6 0.0 - 1.5 %    MetHb 0.4 0.0 - 1.5 %    O2 Content 18.0 mL/dL    HHb 9.2 (H) 0.0 - 5.0 %    tHb (est) 14.3 11.5 - 16.5 g/dL    Mode AC     FIO2 30.0 %    Rr Mechanical 14.0 b/min    Vt Mechanical 450.0 mL    Peep/Cpap 5.0 cmH2O    Date Of Collection      Time Collected      Pt Temp 37      ID [id number]     Lab [id number]     Critical(s) Notified . No Critical Values        Imaging  Ct Head Wo Contrast    Result Date: 6/12/2019  Location: 200 Indication: Unresponsive Comparison: None available.  Technique: Multidetector CT imaging was obtained from the skull base to vertex without the administration of intravenous contrast. Coronal and sagittal reformatted images were obtained. Automated dose exposure control was used for this exam. FINDINGS: Ventricles and sulci normal in size and configuration. No extra-axial collection. No acute intracranial hemorrhage. No cerebral edema or mass effect. No CT evidence of acute, large territorial infarction. There is moderate left ethmoid and maxillary sinus mucosal thickening with frothy secretions within the left maxillary sinus. Visualized mastoid air cells are well aerated. The calvarium is intact. 1. No acute intracranial hemorrhage or mass effect. 2. Left ethmoid and maxillary sinus disease. Ct Cervical Spine Wo Contrast    Result Date: 6/12/2019  Location: 200 Indication: Unresponsive, fall. Comparison: None available. Technique: Multidetector CT imaging of the cervical spine was performed without administration of intravenous contrast. Coronal and sagittal reformatted images were obtained. Automated dose exposure control was used for this exam. Findings: There is nonspecific straightening of the cervical lordosis. Vertebral bodies maintain normal height. Alignment is maintained. Atlantodental distance is preserved. The craniocervical junction is normal in appearance. No acute cervical spine fracture is identified. There is no prevertebral soft tissue edema. Intervertebral discs are preserved in height. There is no significant osseous encroachment of the central canal and neural foramina. Visualized portions of bilateral lung apices are grossly clear. An endotracheal tube and orogastric tube are noted. There are carious lesions of multiple mandibular teeth. 1. No acute cervical spine fracture. 2. Nonspecific straightening of the cervical lordosis. Xr Chest Portable    Result Date: 6/13/2019  Reading location: 200 Indication: Shortness of breath Comparison: Prior chest radiograph from 6/12/2019 at 0814 hours Technique: Portable AP upright chest radiograph was obtained.  Findings: Endotracheal tube tip projects at the level of the clavicles. An orogastric tube tip terminates within the stomach. The cardiomediastinal silhouette is normal in size and contours. Bilateral lungs and costophrenic angles are clear. There is no evidence of pneumothorax. 1. No acute cardiopulmonary disease. 2. Support devices unchanged in position. Xr Chest Portable    Result Date: 6/12/2019  Reading location: 200 Indication: Endotracheal tube placement Comparison: Prior chest radiograph from 6/12/2017 at 0714 hours Technique: Portable AP upright chest radiograph was obtained. Findings: Endotracheal tube tip now projects within the mid trachea at the level of the clavicles. An orogastric tube tip terminates within the stomach. The cardiomediastinal silhouette is normal in size and contours. Bilateral lungs and costophrenic angles are clear. There is no evidence of pneumothorax. Endotracheal tube tip in appropriate position. Xr Chest Abdomen Ng Placement    Result Date: 6/12/2019  Reading location: 200 Indication: Intubation and OG tube placement Comparison: None available Technique: Portable supine radiographs of the chest and upper abdomen were obtained. Findings: Initial radiograph demonstrates a endotracheal tube tip projecting within the upper trachea approximately 14 cm above the level the kareen. The OG tube was initially coiled within the pharynx, however subsequent radiograph demonstrates distal tip within the stomach. The cardiomediastinal silhouette is normal in size and contours. Bilateral lungs and costophrenic angles are clear. There is no evidence of pneumothorax. 1. Endotracheal tube tip projects within the upper trachea approximately 14 cm above the level the kareen. Advancement is recommended. 2. OG tube tip terminates within the stomach. 3. No acute cardiopulmonary disease.         Microbiology  Blood cultures pending        Assessment and Plan  Patient is a 41 y.o. male who presented with unresponsive. The active problem list is as follows:    · Unresponsive due to likely drug overdose with cocaine, ecstasy, and alcohol  · Recent diagnosis of strep throat  · Acute hypoxic respiratory failure  · Hepatitis C  · HAGMA with lactic acidosis  · UDS positive for amphetamines and cocaine  · Alcohol abuse  · Elevated transaminases  · AISHA unknown baseline creatinine        -hepatitis C returned positive, HIV negative, consult GI   -RUQ US ordered  -Continue rocephin for now, can likely transition to oral amoxicillin once extubated. . Strep swab negative however was difficult to obtain secondary to ET tube  -Will wean off sedation today with intent to extubate      · Routine labs in AM.  · DVT prophylaxis. - lovenox  · Please see orders for further management and care.     The plan was discussed with Dr. Holli Lawson    10:40 AM  6/13/2019

## 2019-06-13 NOTE — PROGRESS NOTES
Patient extubated to 3 lpm NC nurse at bedside; wean O2 as tolerated suctioned prior to for moderate amount B/S are clear with mild rales bilaterally pulse ox 100% tolerated procedure well

## 2019-06-14 LAB
ALBUMIN SERPL-MCNC: 2.7 G/DL (ref 3.5–5.2)
ALP BLD-CCNC: 82 U/L (ref 40–129)
ALT SERPL-CCNC: 302 U/L (ref 0–40)
ANION GAP SERPL CALCULATED.3IONS-SCNC: 5 MMOL/L (ref 7–16)
AST SERPL-CCNC: 230 U/L (ref 0–39)
BILIRUB SERPL-MCNC: 0.4 MG/DL (ref 0–1.2)
BUN BLDV-MCNC: 11 MG/DL (ref 6–20)
CALCIUM SERPL-MCNC: 7.6 MG/DL (ref 8.6–10.2)
CHLORIDE BLD-SCNC: 105 MMOL/L (ref 98–107)
CO2: 29 MMOL/L (ref 22–29)
CREAT SERPL-MCNC: 0.9 MG/DL (ref 0.7–1.2)
EKG ATRIAL RATE: 86 BPM
EKG P AXIS: 78 DEGREES
EKG P-R INTERVAL: 140 MS
EKG Q-T INTERVAL: 422 MS
EKG QRS DURATION: 100 MS
EKG QTC CALCULATION (BAZETT): 504 MS
EKG R AXIS: 82 DEGREES
EKG T AXIS: 68 DEGREES
EKG VENTRICULAR RATE: 86 BPM
GFR AFRICAN AMERICAN: >60
GFR NON-AFRICAN AMERICAN: >60 ML/MIN/1.73
GLUCOSE BLD-MCNC: 111 MG/DL (ref 74–99)
HCT VFR BLD CALC: 37.8 % (ref 37–54)
HEMOGLOBIN: 13.2 G/DL (ref 12.5–16.5)
IGA: 218 MG/DL (ref 70–400)
IGG: 1055 MG/DL (ref 700–1600)
IGM: 103 MG/DL (ref 40–230)
INR BLD: 1.1
MCH RBC QN AUTO: 33.5 PG (ref 26–35)
MCHC RBC AUTO-ENTMCNC: 34.9 % (ref 32–34.5)
MCV RBC AUTO: 95.9 FL (ref 80–99.9)
MRSA CULTURE ONLY: NORMAL
PDW BLD-RTO: 12.6 FL (ref 11.5–15)
PLATELET # BLD: 130 E9/L (ref 130–450)
PMV BLD AUTO: 10.8 FL (ref 7–12)
POTASSIUM SERPL-SCNC: 3.5 MMOL/L (ref 3.5–5)
PROTHROMBIN TIME: 12.6 SEC (ref 9.3–12.4)
RBC # BLD: 3.94 E12/L (ref 3.8–5.8)
SODIUM BLD-SCNC: 139 MMOL/L (ref 132–146)
TOTAL CK: 1116 U/L (ref 20–200)
TOTAL PROTEIN: 5.2 G/DL (ref 6.4–8.3)
WBC # BLD: 13 E9/L (ref 4.5–11.5)

## 2019-06-14 PROCEDURE — 93010 ELECTROCARDIOGRAM REPORT: CPT | Performed by: INTERNAL MEDICINE

## 2019-06-14 PROCEDURE — 97165 OT EVAL LOW COMPLEX 30 MIN: CPT

## 2019-06-14 PROCEDURE — 97116 GAIT TRAINING THERAPY: CPT | Performed by: PHYSICAL THERAPIST

## 2019-06-14 PROCEDURE — 6370000000 HC RX 637 (ALT 250 FOR IP): Performed by: INTERNAL MEDICINE

## 2019-06-14 PROCEDURE — 85610 PROTHROMBIN TIME: CPT

## 2019-06-14 PROCEDURE — 97161 PT EVAL LOW COMPLEX 20 MIN: CPT | Performed by: PHYSICAL THERAPIST

## 2019-06-14 PROCEDURE — 36415 COLL VENOUS BLD VENIPUNCTURE: CPT

## 2019-06-14 PROCEDURE — 2580000003 HC RX 258: Performed by: INTERNAL MEDICINE

## 2019-06-14 PROCEDURE — 85027 COMPLETE CBC AUTOMATED: CPT

## 2019-06-14 PROCEDURE — 80053 COMPREHEN METABOLIC PANEL: CPT

## 2019-06-14 PROCEDURE — 82550 ASSAY OF CK (CPK): CPT

## 2019-06-14 PROCEDURE — 1200000000 HC SEMI PRIVATE

## 2019-06-14 PROCEDURE — 2700000000 HC OXYGEN THERAPY PER DAY

## 2019-06-14 PROCEDURE — 94664 DEMO&/EVAL PT USE INHALER: CPT

## 2019-06-14 PROCEDURE — 97530 THERAPEUTIC ACTIVITIES: CPT

## 2019-06-14 PROCEDURE — 6360000002 HC RX W HCPCS: Performed by: INTERNAL MEDICINE

## 2019-06-14 RX ADMIN — Medication 10 ML: at 09:17

## 2019-06-14 RX ADMIN — IBUPROFEN 400 MG: 400 TABLET ORAL at 18:09

## 2019-06-14 RX ADMIN — SODIUM CHLORIDE, POTASSIUM CHLORIDE, SODIUM LACTATE AND CALCIUM CHLORIDE: 600; 310; 30; 20 INJECTION, SOLUTION INTRAVENOUS at 16:11

## 2019-06-14 RX ADMIN — WATER 2 G: 1 INJECTION INTRAMUSCULAR; INTRAVENOUS; SUBCUTANEOUS at 09:17

## 2019-06-14 ASSESSMENT — PAIN SCALES - GENERAL
PAINLEVEL_OUTOF10: 0
PAINLEVEL_OUTOF10: 0
PAINLEVEL_OUTOF10: 10

## 2019-06-14 NOTE — PROGRESS NOTES
PROGRESS NOTE    By Ivana Diaz D.O., GI Fellow    [facility]  Dr. Damaris Aburto M.D., Dr. Toby Howard M.D., Dr. Leonides Alexander, Dr. Aileen Ro M.D., Dr. Dawson School  41 y.o.  male    SUBJECTIVE:  No new complaints this morning. Tolerating diet OK, No diarrhea/constipation, minimal abdominal pain.   -Labs and imaging discussed with the patient. OBJECTIVE:  /78   Pulse 89   Temp 98 °F (36.7 °C) (Oral)   Resp 18   Ht 5' 11\" (1.803 m)   Wt 164 lb 8 oz (74.6 kg)   SpO2 95%   BMI 22.94 kg/m²   GEN: A&Ox3, cantankerous, NAD  HEENT:PEERL, no icterus  Heart: RRR  Lungs: CTAB  Abd.: soft, NT, ND, BS +, no G/R, no HSM  Extr.: no C/C/E, no brusing      Stool (measured) : 0 mL  Lab Results   Component Value Date    WBC 13.0 06/14/2019    WBC 12.9 06/13/2019    WBC 12.0 06/12/2019    HGB 13.2 06/14/2019    HGB 13.4 06/13/2019    HGB 17.3 06/12/2019    HCT 37.8 06/14/2019    MCV 95.9 06/14/2019    RDW 12.6 06/14/2019     06/14/2019     06/13/2019     06/12/2019     Lab Results   Component Value Date     06/14/2019    K 3.5 06/14/2019    K 4.4 06/12/2019     06/14/2019    CO2 29 06/14/2019    BUN 11 06/14/2019    CREATININE 0.9 06/14/2019    CALCIUM 7.6 06/14/2019    PROT 5.2 06/14/2019    LABALBU 2.7 06/14/2019    BILITOT 0.4 06/14/2019    BILITOT 0.4 06/13/2019    BILITOT 0.4 06/12/2019    ALKPHOS 82 06/14/2019    ALKPHOS 79 06/13/2019    ALKPHOS 87 06/12/2019     06/14/2019     06/13/2019     06/12/2019     06/14/2019     06/13/2019     06/12/2019     No results found for: LIPASE  No results found for: AMYLASE      ASSESSMENT/PLAN:  1. Hepatitis likely secondary to HCV vs shock (enzymes not as high as expected) vs. Alcoholic hepatitis (bili not as high as expected)  - Chemistries improving, Ok to d/c from a GI POV.    - Hepatitis C antibody positive, pt will need outpt follow-up/work-up, he will need to abstain from drug use, this was discussed with the patient  - Unobstructive liver profile  - US and doppler were reviewed with the patient - .  - DF of 3.2 no indication for steroids  - No signs of cirrhosis  - Will repeat labs as outpt and pending results consider further testing to evaluate other etiologies of liver chemistry elevation ie. Metabolic/AI etiologies.      2. Respiratory failure/overdose  -Per admitting/CCM     3.  Substance abuse (cocaine & ecstasy)  -Patient advised to quit    Pt was seen, d/w, and examined with Dr. Duane Chsae and Dr. Leonides Rosario DO  6/14/2019  8:48 AM     Pt seen and independently examined. Pertinent notes and lab work reviewed. D/w Dr. Pedro Morrison. Agree with physical exam and A&P. Discussed with patient /mother- all questions answered - agreeable with the plan as delineated. OK to be d/c from GI POV. Follow in office in 2-3 weeks or earlier as needed - patient to call for appointment and with questions/concerns at [phone]. Thank you for the opportunity to participate in the care of [address]         Hien Dunlap MD  6/14/2019  11:25 AM

## 2019-06-14 NOTE — PROGRESS NOTES
HPI:  Patti Garner was transferred from the intensive care unit yesterday following extubation. He remains uncomfortable during my examination today with generalized muscle aches and pains. He also admits to ongoing shortness of breath. No family members were present during my examination. We discussed meeting with the peer recovery team today in the setting of his substance abuse. Patient voiced no new complaints since hospital admission. Questions, answers, and tests reviewed. ROS:  Cardiovascular:   Denies any chest pain, irregular heartbeats, or palpitations. Respiratory:   Ongoing shortness of breath with painful coughing. No significant sputum production. Gastrointestinal:   Denies nausea, vomiting, diarrhea, or constipation. Denies any abdominal pain. Extremities:   Denies any lower extremity swelling or edema. Neurology:    Denies any headache or focal neurological deficits. Admits to muscle aches and pains with associated weakness. Derm:    Denies any rashes, ulcers, or excoriations. Denies bruising. Genitourinary:    Denies any urgency, frequency, hematuria. Voiding with the use of a Dodge catheter. Physical Exam:    Vitals:    06/13/19 2015   BP: (!) 118/52   Pulse: 85   Resp: 16   Temp: 98.8 °F (37.1 °C)   SpO2: 98%       HEENT:  PERRLA. EOMI. Sclera clear. Buccal mucosa moist.    Neck:  Supple. Trachea midline. No thyromegaly. No JVD. No bruits. Heart:  Rhythm regular, rate controlled. No murmurs. Lungs:  Symmetrical.  Diminished bilaterally with decreased breath sounds throughout. Abdomen: Soft. Non-tender. Non-distended. Bowel sounds positive. No organomegaly or masses. No pain on palpation    Extremities:  Peripheral pulses present. No peripheral edema. No ulcers. Neurologic:  Alert x 3. No focal deficit. Cranial nerves grossly intact. Skin:  No petechia. No hemorrhage. No wounds.     CBC with Differential:    Lab Results   Component Value Date    WBC 13.0

## 2019-06-14 NOTE — PROGRESS NOTES
Glasses: no   Edema: no     Sensation: intact   Hand Dominance:  Left     X  Right     Left Right Comment   Passive range of motion [facility]     Active range of motion [facility]     Muscle Grade 5/5 5/5    /pinch Strength Intact  Intact     [] Malnutrition indicators have been identified and nursing has been notified to ensure a dietitian consult is ordered. Function Assessment    Status  Goal Comment   Feeding:  Independent   Independent      Grooming:  Independent  Independent      UE dressing:  Independent  Independent     LE dressing:  Supervision  Independent     Bathing:  Supervision  Independent     Bed Mobility:     Supervision  for supine to sit,   Supervision  for scooting,  Sit to supine: n/t as pt was up in chair  Independent     Functional Transfers:    Supervision  for sit to stand transfer from EOB and chair  Independent  Safety: good    Functional Mobility: 50 feet x 2 with no device and  Supervision  Independent       Pt/family participated in establishment of goals. Balance:   Sitting: good   Standing: good with no device    Endurance: good       Assessment of Current Deficits:   [x]Functional mobility  []ROM  [x]Strength   []Cognition   [x]Safety Awareness    [x]ADLs [x]IADLs   [x]Endurance  [x]Balance    []Vision  []Sensation     []Gross Motor Coordination       []Fine Motor Coordination      · Low Complexity  · History: Brief history including review of medical records relating to the problem  · Exam: 1-3 performance Deficits  · Assistance/Modification: No assistance or modifications required to perform tasks. No comorbities affecting occupational performance.      Patients Goal: return home   Treatment: OT eval, bed mobility, sitting balance at EOB for 10 minutes with supervision, supervision to don sweatpants, transfer training with verbal cues for hand placement, static standing balance with no device, functional mobility with no device in hallway, pt returned to room and was seated in chair while bedside chair was set up, pt assisted to bedside chair. All needs within reach. Rehab Potential: good   Plan of care: Patient will be seen by OT 1-3 PRN times a week for therapeutic activity, ADL re-training, bed mobility, functional transfers, functional mobility, safety and fall prevention, balance and endurance activities, instruction in energy conservation principles, and patient/family education. Patient and/or family understands diagnosis, prognosis, education and plan of care. Pt/family verbalized understanding.      Time Code treatment minutes: Archana OTR/L #[id number]

## 2019-06-15 ENCOUNTER — APPOINTMENT (OUTPATIENT)
Dept: GENERAL RADIOLOGY | Age: 39
DRG: 816 | End: 2019-06-15
Payer: MEDICARE

## 2019-06-15 LAB
ALBUMIN SERPL-MCNC: 3.1 G/DL (ref 3.5–5.2)
ALP BLD-CCNC: 69 U/L (ref 40–129)
ALT SERPL-CCNC: 234 U/L (ref 0–40)
AMPHETAMINES, URINE: 390 NG/ML
ANION GAP SERPL CALCULATED.3IONS-SCNC: 8 MMOL/L (ref 7–16)
AST SERPL-CCNC: 154 U/L (ref 0–39)
BILIRUB SERPL-MCNC: 0.4 MG/DL (ref 0–1.2)
BUN BLDV-MCNC: 9 MG/DL (ref 6–20)
CALCIUM SERPL-MCNC: 8.7 MG/DL (ref 8.6–10.2)
CHLORIDE BLD-SCNC: 105 MMOL/L (ref 98–107)
CO2: 27 MMOL/L (ref 22–29)
CREAT SERPL-MCNC: 0.7 MG/DL (ref 0.7–1.2)
GFR AFRICAN AMERICAN: >60
GFR NON-AFRICAN AMERICAN: >60 ML/MIN/1.73
GLUCOSE BLD-MCNC: 107 MG/DL (ref 74–99)
HCT VFR BLD CALC: 38.4 % (ref 37–54)
HEMOGLOBIN: 13.5 G/DL (ref 12.5–16.5)
MCH RBC QN AUTO: 33.7 PG (ref 26–35)
MCHC RBC AUTO-ENTMCNC: 35.2 % (ref 32–34.5)
MCV RBC AUTO: 95.8 FL (ref 80–99.9)
METHAMPHETAMINE, URINE: NORMAL NG/ML
METHYLENEDIOXYAMPHETAMINE, UR: <200 NG/ML
METHYLENEDIOXYETHYLAMPHETAMINE, UR: <200 NG/ML
METHYLENEDIOXYMETHAMPHETAMINE, UR: <200 NG/ML
PDW BLD-RTO: 12.5 FL (ref 11.5–15)
PLATELET # BLD: 136 E9/L (ref 130–450)
PMV BLD AUTO: 11 FL (ref 7–12)
POTASSIUM SERPL-SCNC: 3.7 MMOL/L (ref 3.5–5)
RBC # BLD: 4.01 E12/L (ref 3.8–5.8)
SODIUM BLD-SCNC: 140 MMOL/L (ref 132–146)
TOTAL PROTEIN: 5.6 G/DL (ref 6.4–8.3)
WBC # BLD: 8.5 E9/L (ref 4.5–11.5)

## 2019-06-15 PROCEDURE — 71045 X-RAY EXAM CHEST 1 VIEW: CPT

## 2019-06-15 PROCEDURE — 85027 COMPLETE CBC AUTOMATED: CPT

## 2019-06-15 PROCEDURE — 6360000002 HC RX W HCPCS: Performed by: INTERNAL MEDICINE

## 2019-06-15 PROCEDURE — 1200000000 HC SEMI PRIVATE

## 2019-06-15 PROCEDURE — 80053 COMPREHEN METABOLIC PANEL: CPT

## 2019-06-15 PROCEDURE — 6370000000 HC RX 637 (ALT 250 FOR IP): Performed by: INTERNAL MEDICINE

## 2019-06-15 PROCEDURE — C9113 INJ PANTOPRAZOLE SODIUM, VIA: HCPCS | Performed by: INTERNAL MEDICINE

## 2019-06-15 PROCEDURE — 36415 COLL VENOUS BLD VENIPUNCTURE: CPT

## 2019-06-15 PROCEDURE — 2580000003 HC RX 258: Performed by: INTERNAL MEDICINE

## 2019-06-15 RX ORDER — PROMETHAZINE HYDROCHLORIDE 25 MG/ML
25 INJECTION, SOLUTION INTRAMUSCULAR; INTRAVENOUS EVERY 4 HOURS PRN
Status: DISCONTINUED | OUTPATIENT
Start: 2019-06-15 | End: 2019-06-16 | Stop reason: HOSPADM

## 2019-06-15 RX ORDER — PANTOPRAZOLE SODIUM 40 MG/10ML
40 INJECTION, POWDER, LYOPHILIZED, FOR SOLUTION INTRAVENOUS DAILY
Status: DISCONTINUED | OUTPATIENT
Start: 2019-06-15 | End: 2019-06-16 | Stop reason: HOSPADM

## 2019-06-15 RX ADMIN — SODIUM CHLORIDE, POTASSIUM CHLORIDE, SODIUM LACTATE AND CALCIUM CHLORIDE: 600; 310; 30; 20 INJECTION, SOLUTION INTRAVENOUS at 05:36

## 2019-06-15 RX ADMIN — WATER 2 G: 1 INJECTION INTRAMUSCULAR; INTRAVENOUS; SUBCUTANEOUS at 09:21

## 2019-06-15 RX ADMIN — PANTOPRAZOLE SODIUM 40 MG: 40 INJECTION, POWDER, FOR SOLUTION INTRAVENOUS at 14:27

## 2019-06-15 RX ADMIN — PROMETHAZINE HYDROCHLORIDE 25 MG: 25 INJECTION INTRAMUSCULAR; INTRAVENOUS at 09:08

## 2019-06-15 RX ADMIN — Medication 10 ML: at 14:27

## 2019-06-15 RX ADMIN — Medication 10 ML: at 22:24

## 2019-06-15 RX ADMIN — Medication 10 ML: at 09:22

## 2019-06-15 RX ADMIN — IBUPROFEN 400 MG: 400 TABLET ORAL at 20:22

## 2019-06-15 RX ADMIN — IBUPROFEN 400 MG: 400 TABLET ORAL at 08:18

## 2019-06-15 ASSESSMENT — PAIN DESCRIPTION - PAIN TYPE
TYPE: ACUTE PAIN
TYPE: ACUTE PAIN

## 2019-06-15 ASSESSMENT — PAIN DESCRIPTION - PROGRESSION
CLINICAL_PROGRESSION: GRADUALLY WORSENING
CLINICAL_PROGRESSION: GRADUALLY WORSENING

## 2019-06-15 ASSESSMENT — PAIN DESCRIPTION - LOCATION
LOCATION: ABDOMEN
LOCATION: ABDOMEN

## 2019-06-15 ASSESSMENT — PAIN DESCRIPTION - FREQUENCY
FREQUENCY: INTERMITTENT
FREQUENCY: INTERMITTENT

## 2019-06-15 ASSESSMENT — PAIN - FUNCTIONAL ASSESSMENT
PAIN_FUNCTIONAL_ASSESSMENT: PREVENTS OR INTERFERES SOME ACTIVE ACTIVITIES AND ADLS
PAIN_FUNCTIONAL_ASSESSMENT: ACTIVITIES ARE NOT PREVENTED

## 2019-06-15 ASSESSMENT — PAIN SCALES - GENERAL
PAINLEVEL_OUTOF10: 6
PAINLEVEL_OUTOF10: 5
PAINLEVEL_OUTOF10: 2
PAINLEVEL_OUTOF10: 0

## 2019-06-15 ASSESSMENT — PAIN DESCRIPTION - DESCRIPTORS
DESCRIPTORS: ACHING;CRAMPING;DULL
DESCRIPTORS: ACHING;DISCOMFORT;DULL

## 2019-06-15 ASSESSMENT — PAIN DESCRIPTION - ONSET
ONSET: ON-GOING
ONSET: ON-GOING

## 2019-06-15 ASSESSMENT — PAIN DESCRIPTION - ORIENTATION
ORIENTATION: RIGHT;LEFT
ORIENTATION: RIGHT;LEFT

## 2019-06-15 NOTE — PROGRESS NOTES
Unobstructive liver profile  - US and doppler were reviewed with the patient - .  - DF of 3.2 no indication for steroids  - No signs of cirrhosis  - Will repeat labs as outpt and pending results consider further testing to evaluate other etiologies of liver chemistry elevation ie. Metabolic/AI etiologies.      2. Respiratory failure/overdose  -Per admitting/CCM     3.  Substance abuse (cocaine & ecstasy)  -Patient advised to quit      Positive HCV Ab. Will follow for treatment as outpatient. Patient to follow in our office in 2-3 weeks. Patient to call for appointment.  [phone]. Thank you for the opportunity to participate in the care of Mr. Nick Diaz. KALEB Arevalo CNP  6/15/2019  4:33 PM     Pt seen and independently examined. Pertinent notes and lab work reviewed. D/w Dr. Jimmie Mendoza. Agree with physical exam and A&P. Discussed with patient /mother- all questions answered - agreeable with the plan as delineated. OK to be d/c from GI POV. Follow in office in 2-3 weeks or earlier as needed - patient to call for appointment and with questions/concerns at [phone]. Thank you for the opportunity to participate in the care of [address]         KALEB Arevalo CNP  6/15/2019  4:33 PM

## 2019-06-16 VITALS
TEMPERATURE: 97.5 F | SYSTOLIC BLOOD PRESSURE: 117 MMHG | HEART RATE: 67 BPM | WEIGHT: 164.5 LBS | DIASTOLIC BLOOD PRESSURE: 69 MMHG | HEIGHT: 71 IN | BODY MASS INDEX: 23.03 KG/M2 | OXYGEN SATURATION: 97 % | RESPIRATION RATE: 20 BRPM

## 2019-06-16 LAB
ALBUMIN SERPL-MCNC: 3.1 G/DL (ref 3.5–5.2)
ALP BLD-CCNC: 82 U/L (ref 40–129)
ALT SERPL-CCNC: 205 U/L (ref 0–40)
ANION GAP SERPL CALCULATED.3IONS-SCNC: 8 MMOL/L (ref 7–16)
AST SERPL-CCNC: 118 U/L (ref 0–39)
BILIRUB SERPL-MCNC: 0.3 MG/DL (ref 0–1.2)
BUN BLDV-MCNC: 13 MG/DL (ref 6–20)
CALCIUM SERPL-MCNC: 8.8 MG/DL (ref 8.6–10.2)
CHLORIDE BLD-SCNC: 104 MMOL/L (ref 98–107)
CO2: 27 MMOL/L (ref 22–29)
CREAT SERPL-MCNC: 0.8 MG/DL (ref 0.7–1.2)
GFR AFRICAN AMERICAN: >60
GFR NON-AFRICAN AMERICAN: >60 ML/MIN/1.73
GLUCOSE BLD-MCNC: 112 MG/DL (ref 74–99)
HCT VFR BLD CALC: 41.3 % (ref 37–54)
HEMOGLOBIN: 14.2 G/DL (ref 12.5–16.5)
MCH RBC QN AUTO: 33 PG (ref 26–35)
MCHC RBC AUTO-ENTMCNC: 34.4 % (ref 32–34.5)
MCV RBC AUTO: 96 FL (ref 80–99.9)
PDW BLD-RTO: 12.6 FL (ref 11.5–15)
PLATELET # BLD: 170 E9/L (ref 130–450)
PMV BLD AUTO: 11.1 FL (ref 7–12)
POTASSIUM SERPL-SCNC: 3.7 MMOL/L (ref 3.5–5)
RBC # BLD: 4.3 E12/L (ref 3.8–5.8)
SODIUM BLD-SCNC: 139 MMOL/L (ref 132–146)
TOTAL PROTEIN: 6 G/DL (ref 6.4–8.3)
WBC # BLD: 8.1 E9/L (ref 4.5–11.5)

## 2019-06-16 PROCEDURE — 6360000002 HC RX W HCPCS: Performed by: INTERNAL MEDICINE

## 2019-06-16 PROCEDURE — 80053 COMPREHEN METABOLIC PANEL: CPT

## 2019-06-16 PROCEDURE — C9113 INJ PANTOPRAZOLE SODIUM, VIA: HCPCS | Performed by: INTERNAL MEDICINE

## 2019-06-16 PROCEDURE — 85027 COMPLETE CBC AUTOMATED: CPT

## 2019-06-16 PROCEDURE — 36415 COLL VENOUS BLD VENIPUNCTURE: CPT

## 2019-06-16 PROCEDURE — 2580000003 HC RX 258: Performed by: INTERNAL MEDICINE

## 2019-06-16 RX ORDER — PANTOPRAZOLE SODIUM 40 MG/1
40 TABLET, DELAYED RELEASE ORAL
Qty: 30 TABLET | Refills: 1 | Status: SHIPPED | OUTPATIENT
Start: 2019-06-16

## 2019-06-16 RX ADMIN — WATER 2 G: 1 INJECTION INTRAMUSCULAR; INTRAVENOUS; SUBCUTANEOUS at 10:21

## 2019-06-16 RX ADMIN — PANTOPRAZOLE SODIUM 40 MG: 40 INJECTION, POWDER, FOR SOLUTION INTRAVENOUS at 10:21

## 2019-06-16 RX ADMIN — Medication 10 ML: at 10:24

## 2019-06-16 ASSESSMENT — PAIN SCALES - GENERAL
PAINLEVEL_OUTOF10: 0
PAINLEVEL_OUTOF10: 0

## 2019-06-16 NOTE — PLAN OF CARE
Problem: Falls - Risk of:  Goal: Will remain free from falls  Description  Will remain free from falls  6/14/2019 1429 by Yvette Dunn RN  Outcome: Met This Shift  6/14/2019 0248 by Symone Aburto RN  Outcome: Met This Shift  Goal: Absence of physical injury  Description  Absence of physical injury  6/14/2019 1429 by Yvette Dunn RN  Outcome: Met This Shift  6/14/2019 0248 by Symone Aburto RN  Outcome: Met This Shift     Problem: Pain:  Goal: Pain level will decrease  Description  Pain level will decrease  6/14/2019 1429 by Yvette Dunn RN  Outcome: Met This Shift  6/14/2019 0248 by Symone Aburto RN  Outcome: Met This Shift  Goal: Control of acute pain  Description  Control of acute pain  6/14/2019 1429 by Yvette Dunn RN  Outcome: Met This Shift  6/14/2019 0248 by Symone Aburto RN  Outcome: Met This Shift  Goal: Control of chronic pain  Description  Control of chronic pain  6/14/2019 1429 by Yvette Dunn RN  Outcome: Met This Shift  6/14/2019 0248 by Symone Aburto RN  Outcome: Met This Shift
Problem: Falls - Risk of:  Goal: Will remain free from falls  Description  Will remain free from falls  6/15/2019 0930 by Calixto Russell RN  Outcome: Met This Shift     Problem: Falls - Risk of:  Goal: Absence of physical injury  Description  Absence of physical injury  6/15/2019 0930 by Calixto Russell RN  Outcome: Met This Shift     Problem: Pain:  Goal: Pain level will decrease  Description  Pain level will decrease  Outcome: Met This Shift     Problem: Pain:  Goal: Control of acute pain  Description  Control of acute pain  Outcome: Met This Shift     Problem: Pain:  Goal: Control of chronic pain  Description  Control of chronic pain  Outcome: Met This Shift
Problem: Falls - Risk of:  Goal: Will remain free from falls  Description  Will remain free from falls  6/16/2019 1027 by Margy Paredes RN  Outcome: Completed     Problem: Falls - Risk of:  Goal: Absence of physical injury  Description  Absence of physical injury  6/16/2019 1027 by Margy Paredes RN  Outcome: Completed     Problem: Pain:  Goal: Pain level will decrease  Description  Pain level will decrease  6/16/2019 1027 by Margy Paredes RN  Outcome: Completed     Problem: Pain:  Goal: Control of acute pain  Description  Control of acute pain  6/16/2019 1027 by Margy Paredes RN  Outcome: Completed     Problem: Pain:  Goal: Control of chronic pain  Description  Control of chronic pain  6/16/2019 1027 by Margy Paredes RN  Outcome: Completed     Problem: Pain:  Goal: Control of chronic pain  Description  Control of chronic pain  6/16/2019 1027 by Margy Paredes RN  Outcome: Completed
Problem: Falls - Risk of:  Goal: Will remain free from falls  Description  Will remain free from falls  Outcome: Met This Shift     Problem: Falls - Risk of:  Goal: Absence of physical injury  Description  Absence of physical injury  Outcome: Met This Shift     Problem: Pain:  Description  Pain management should include both nonpharmacologic and pharmacologic interventions. Goal: Pain level will decrease  Description  Pain level will decrease  Outcome: Met This Shift     Problem: Pain:  Description  Pain management should include both nonpharmacologic and pharmacologic interventions. Goal: Control of acute pain  Description  Control of acute pain  Outcome: Met This Shift     Problem: Pain:  Description  Pain management should include both nonpharmacologic and pharmacologic interventions.   Goal: Control of chronic pain  Description  Control of chronic pain  Outcome: Met This Shift
Problem: Falls - Risk of:  Goal: Will remain free from falls  Description  Will remain free from falls  Outcome: Met This Shift     Problem: Falls - Risk of:  Goal: Absence of physical injury  Description  Absence of physical injury  Outcome: Met This Shift     Problem: Pain:  Goal: Pain level will decrease  Description  Pain level will decrease  Outcome: Met This Shift     Problem: Pain:  Goal: Control of acute pain  Description  Control of acute pain  Outcome: Met This Shift     Problem: Pain:  Goal: Control of chronic pain  Description  Control of chronic pain  Outcome: Met This Shift
Problem: Restraint Use - Nonviolent/Non-Self-Destructive Behavior:  Goal: Absence of restraint-related injury  Description  Absence of restraint-related injury  6/13/2019 0553 by Serge Yen RN  Outcome: Met This Shift     Problem: Restraint Use - Nonviolent/Non-Self-Destructive Behavior:  Goal: Absence of restraint indications  Description  Absence of restraint indications  6/13/2019 0553 by Serge Yen RN  Outcome: Not Met This Shift  Still reaches for lines/tubes
Problem: Restraint Use - Nonviolent/Non-Self-Destructive Behavior:  Goal: Absence of restraint-related injury  Description  Absence of restraint-related injury  Outcome: Met This Shift     Problem: Restraint Use - Nonviolent/Non-Self-Destructive Behavior:  Goal: Absence of restraint indications  Description  Absence of restraint indications  Outcome: Not Met This Shift  Patient increasingly agitated, pulling at ETT, OG and IV tubes. Restraints continued for patient's safety. Will continue to reassess. Family/visitors educated on the importance of needing to de-stimulate patient and keep restraints intact for the patient's safety. All visitors aware and verbalize understanding.
I have personally performed a face to face diagnostic evaluation on this patient. I have reviewed the ACP note and agree with the history, exam and plan of care, except as noted.

## 2019-06-17 LAB
BLOOD CULTURE, ROUTINE: NORMAL
CULTURE, BLOOD 2: NORMAL

## 2019-06-17 NOTE — DISCHARGE SUMMARY
[address]                               DISCHARGE SUMMARY    PATIENT NAME: Mauri Higgins             :        1980  MED REC NO:   [id number]                            ROOM:       0321  ACCOUNT NO:   [de-identified]                           ADMIT DATE: 2019  PROVIDER:     Jami Giron DO                  DISCHARGE DATE:  2019      ADMITTING DIAGNOSIS:  Drug overdose with cocaine and alcohol  resulting in acute respiratory failure with hypoxemia, necessitating  temporary mechanical ventilator with status post extubation on  2019; acute respiratory failure secondary to drug overdose. SECONDARY DISCHARGE DIAGNOSES:  Recent history of strep infection,  hepatitis C, acute renal failure with resolution and transaminitis  secondary to elevated liver enzymes. COMPLICATIONS:  None. OPERATION PERFORMED:  Temporary mechanical ventilator from  to  . CONSULTATIONS OBTAINED:  Intensivist while in the ICU and Dr. Teena Miller. No OMT was given. ADMITTING PHYSICIANS:  Dr. Luc Pearl and Dr. Lucy Simpson. CHIEF COMPLAINT AND HISTORY OF CHIEF COMPLAINT:  This is a pleasant  16-year-old white male who was admitted to [address] [place] [address]. The  patient presented to the hospital here on 2019. The patient  presented to the hospital here under the service of Dr. Lcu Pearl and Dr. Lucy Simpson. The patient presented to the hospital here on , at which  time the patient was found unresponsive. The patient does have a past  medical history that includes drug abuse  laceration. The patient  does have a history of being found unresponsive by his girlfriend. No  family member present at the bedside. The patient presented to the  hospital here, was seen and evaluated. No significant response with  Narcan that was given.   The patient at this time was recently intubated  in the emergency room because of airway compromise, and admitted to the  hospital to the intensive coronary care unit. PAST MEDICAL HISTORY:  Positive for the usual childhood diseases,  history of spinal injury in the thoracic and lumbar area and also the  stomach. PHYSICAL EXAMINATION:  VITAL SIGNS:  Showed blood pressure to be 111/72, pulse 77, respirations  18. Afebrile. GENERAL APPEARANCE:  At this time is a pleasant 82-year-old white male. HEENT:  Normal.  The patient was intubated on the ventilator, sedated. LUNGS:  Coarse. HEART:  Fairly regular without ectopy. ABDOMEN:  Soft. EXTREMITIES:  No edema. While the patient was in the hospital, he had the following laboratory  studies performed: At the time of discharge, chem-1 profile was  satisfactory. CBC was stable. For further labs, please refer to chart. HOSPITAL COURSE OF STAY:  The patient was admitted directly to the  hospital to the intensive coronary care unit. The patient at this time  had been intubated in the emergency room because of airway compromise  and acute respiratory failure with drug overdose. The patient was  extubated on 06/13. The patient remained relatively stable. The  patient clinically improved at this time. He was seen by GI because of  the elevated liver enzymes. For the most part, he did relatively well. He was transferred to a regular room. Diet was progressed as tolerated. His symptom resolved and was felt stable enough to be discharged home on  06/16. At the time of discharge on 06/16, the blood pressure was 117/69, pulse  69, respirations 18. He was afebrile. Further lab work was  satisfactory. The patient was discharged on no medications. The  patient did verbalize understanding. He will follow up with his primary  care doctor, Dr. Archie Alves. He will also follow up with [facility] for treatment of hepatitis C. The patient did decline  inpatient treatment at this time because of the drug overdose.

## 2022-01-24 ENCOUNTER — HOSPITAL ENCOUNTER (EMERGENCY)
Age: 42
Discharge: HOME | End: 2022-01-24
Payer: COMMERCIAL

## 2022-01-24 ENCOUNTER — APPOINTMENT (OUTPATIENT)
Dept: GENERAL RADIOLOGY | Age: 42
End: 2022-01-24
Payer: COMMERCIAL

## 2022-01-24 VITALS
OXYGEN SATURATION: 97 % | WEIGHT: 200 LBS | TEMPERATURE: 98.1 F | SYSTOLIC BLOOD PRESSURE: 107 MMHG | DIASTOLIC BLOOD PRESSURE: 72 MMHG | HEIGHT: 74 IN | RESPIRATION RATE: 18 BRPM | BODY MASS INDEX: 25.67 KG/M2 | HEART RATE: 81 BPM

## 2022-01-24 DIAGNOSIS — Z20.822 SUSPECTED COVID-19 VIRUS INFECTION: Primary | ICD-10-CM

## 2022-01-24 PROCEDURE — 71046 X-RAY EXAM CHEST 2 VIEWS: CPT

## 2022-01-24 PROCEDURE — U0005 INFEC AGEN DETEC AMPLI PROBE: HCPCS

## 2022-01-24 PROCEDURE — U0003 INFECTIOUS AGENT DETECTION BY NUCLEIC ACID (DNA OR RNA); SEVERE ACUTE RESPIRATORY SYNDROME CORONAVIRUS 2 (SARS-COV-2) (CORONAVIRUS DISEASE [COVID-19]), AMPLIFIED PROBE TECHNIQUE, MAKING USE OF HIGH THROUGHPUT TECHNOLOGIES AS DESCRIBED BY CMS-2020-01-R: HCPCS

## 2022-01-24 PROCEDURE — 99211 OFF/OP EST MAY X REQ PHY/QHP: CPT

## 2022-01-24 RX ORDER — ALBUTEROL SULFATE 90 UG/1
2 AEROSOL, METERED RESPIRATORY (INHALATION) EVERY 6 HOURS PRN
Qty: 18 G | Refills: 0 | Status: SHIPPED | OUTPATIENT
Start: 2022-01-24

## 2022-01-24 RX ORDER — AZITHROMYCIN 250 MG/1
TABLET, FILM COATED ORAL
Qty: 6 TABLET | Refills: 0 | Status: SHIPPED | OUTPATIENT
Start: 2022-01-24 | End: 2022-02-03

## 2022-01-24 RX ORDER — COVID-19 ANTIGEN TEST
KIT MISCELLANEOUS
COMMUNITY

## 2022-01-24 ASSESSMENT — PAIN DESCRIPTION - LOCATION: LOCATION: GENERALIZED

## 2022-01-24 ASSESSMENT — PAIN SCALES - GENERAL: PAINLEVEL_OUTOF10: 4

## 2022-01-24 ASSESSMENT — PAIN DESCRIPTION - DESCRIPTORS: DESCRIPTORS: ACHING

## 2022-01-24 ASSESSMENT — PAIN DESCRIPTION - PAIN TYPE: TYPE: ACUTE PAIN

## 2022-01-24 NOTE — ED PROVIDER NOTES
Department of Emergency Medicine  [address] [place] [address]  Provider Note  Admit Date/Time: 2022  9:29 AM  Room:   NAME: Suki Bergman  : 1980  MRN: [id number]     Chief Complaint:  Cough (coughing up thick green mucous) and Generalized Body Aches    History of Present Illness        Suki Bergman is a 46 y.o. male who has a past medical history of:   Past Medical History:   Diagnosis Date    Hepatitis C     IV drug abuse ([address]     Spinal injury     thoracic and lumbar areas    Splenic laceration     Ulcer     stomach    presents to the urgent care center by private car for evaluation. He said he has been sick for 2 days. He has a cough with thick green mucus body aches and congestion. He said he had Covid over a year ago he does not feel he has Covid at this point. He has not been vaccinated. He denies chest pain or shortness of breath. Is not complaining of sinus pain or pressure  ROS    Pertinent positives and negatives are stated within HPI, all other systems reviewed and are negative. Past Surgical History:   Procedure Laterality Date    APPENDECTOMY  [id number]    FRACTURE SURGERY      asiya in right femur   Social History:  reports that he has been smoking cigarettes. He has been smoking about 1.00 pack per day. He has never used smokeless tobacco. He reports current alcohol use. He reports current drug use. Family History: family history includes Alcohol Abuse in his father; Arthritis in his mother; Liver Disease in his father. Allergies: Acetaminophen    Physical Exam   Oxygen Saturation Interpretation: Normal.   ED Triage Vitals [22 0930]   BP Temp Temp Source Pulse Resp SpO2 Height Weight   107/72 98.1 °F (36.7 °C) Infrared 81 18 97 % 6' 2\" (1.88 m) 200 lb (90.7 kg)       Physical Exam  · Constitutional/General: Alert and oriented x3, well appearing, non toxic in NAD  · HEENT:  NC/NT.   Conjunctiva clear, bilateral TMs normal, no tenderness over the sinuses, no pharyngeal erythema no tonsillar enlargement. · Neck: Supple, full ROM,   · Respiratory: Lungs faint expiratory wheeze, not in respiratory distress  · CV:  Regular rate. Regular rhythm. · Musculoskeletal: Moves all extremities x 4.   · Integument: skin warm and dry. No rashes. · Lymphatic: no lymphadenopathy noted  · Neurologic: GCS 15, no focal deficits,   · Psychiatric: Normal Affect    Lab / Imaging Results   (All laboratory and radiology results have been personally reviewed by myself)  Labs:  No results found for this visit on 01/24/22. Imaging: All Radiology results interpreted by Radiologist unless otherwise noted. XR CHEST (2 VW)   Final Result   No acute process. ED Course / Medical Decision Making   Medications - No data to display       Consult(s):   None      MDM:   I did do a chest x-ray and it was negative.,  Covid swab was sent out. I did advise him to stay in quarantine until it comes back I advised him if he has worsening symptoms he needs to go to the emergency department. He was also started on a Z-Jefe and an albuterol inhaler        Assessment      1. Suspected COVID-19 virus infection      Plan   Discharge to home and advised to contact Kirill Vanegas, [address] [place] [address]  [phone]    Schedule an appointment as soon as possible for a visit in 3 days  Follow up within 3 days, Return to ED sooner if symptoms worsen   Patient condition is good    New Medications     New Prescriptions    ALBUTEROL SULFATE HFA (VENTOLIN HFA) 108 (90 BASE) MCG/ACT INHALER    Inhale 2 puffs into the lungs every 6 hours as needed for Wheezing    AZITHROMYCIN (ZITHROMAX Z-JEFE) 250 MG TABLET    Take 2 tabs on day one, followed by 1 tablet daily for 4 days. Electronically signed by KALEB Lilly CNP   DD: 1/24/22  **This report was transcribed using voice recognition software.  Every effort was made to ensure accuracy; however, inadvertent computerized transcription errors may be present.   END OF ED PROVIDER NOTE     Laura Bridges, APRN - CNP  01/24/22 1024

## 2022-01-24 NOTE — Clinical Note
Marta Bacon was seen and treated in our emergency department on 1/24/2022.     He will be absent from work until his Covid test comes back    Prasanna Brown, KALEB - CNP

## 2022-01-25 LAB — SARS-COV-2, PCR: NOT DETECTED

## 2022-06-17 ENCOUNTER — HOSPITAL ENCOUNTER (EMERGENCY)
Age: 42
Discharge: HOME | End: 2022-06-17
Payer: COMMERCIAL

## 2022-06-17 VITALS
OXYGEN SATURATION: 100 % | BODY MASS INDEX: 25.67 KG/M2 | SYSTOLIC BLOOD PRESSURE: 110 MMHG | HEART RATE: 80 BPM | HEIGHT: 74 IN | TEMPERATURE: 97.3 F | DIASTOLIC BLOOD PRESSURE: 79 MMHG | WEIGHT: 200 LBS | RESPIRATION RATE: 18 BRPM

## 2022-06-17 DIAGNOSIS — U07.1 COVID-19: Primary | ICD-10-CM

## 2022-06-17 DIAGNOSIS — R05.9 COUGH: ICD-10-CM

## 2022-06-17 PROCEDURE — 99211 OFF/OP EST MAY X REQ PHY/QHP: CPT

## 2022-06-17 RX ORDER — LORATADINE 10 MG/1
10 TABLET ORAL DAILY
Qty: 30 TABLET | Refills: 0 | Status: SHIPPED | OUTPATIENT
Start: 2022-06-17 | End: 2022-07-17

## 2022-06-17 RX ORDER — BENZONATATE 100 MG/1
100 CAPSULE ORAL 3 TIMES DAILY PRN
Qty: 30 CAPSULE | Refills: 0 | Status: SHIPPED | OUTPATIENT
Start: 2022-06-17 | End: 2022-06-24

## 2022-06-17 RX ORDER — METHYLPREDNISOLONE 4 MG/1
TABLET ORAL
Qty: 1 KIT | Refills: 0 | Status: SHIPPED | OUTPATIENT
Start: 2022-06-17 | End: 2022-06-23

## 2022-06-17 ASSESSMENT — PAIN - FUNCTIONAL ASSESSMENT: PAIN_FUNCTIONAL_ASSESSMENT: 0-10

## 2022-06-17 ASSESSMENT — PAIN SCALES - GENERAL: PAINLEVEL_OUTOF10: 0

## 2022-06-17 NOTE — ED PROVIDER NOTES
HPI:  6/17/22, Time: 4:52 PM EDT         Vickey Hough is a 46 y.o. male presenting to the ED for cough and congestion, beginning 10 days ago. The complaint has been persistent, moderate in severity, and worsened by nothing. Patient reports that he tested positive for COVID 9 days ago. Initially he was having chest pain or shortness of breath with fever however this has resolved leaving him with a cough and congestion still. He reports that he is coughing up mucus. Denies any headache, dizziness, lightheadedness, otalgia or sore throat. Denies recent travel or sick contacts. Patient denies all other symptoms at this time. Review of Systems:   A complete review of systems was performed and pertinent positives and negatives are stated within HPI, all other systems reviewed and are negative.          --------------------------------------------- PAST HISTORY ---------------------------------------------  Past Medical History:  has a past medical history of Hepatitis C, IV drug abuse ([facility] [address] Spinal injury, Splenic laceration, and Ulcer. Past Surgical History:  has a past surgical history that includes fracture surgery and Appendectomy ([id number]). Social History:  reports that he has been smoking cigarettes. He has been smoking about 1.00 pack per day. He has never used smokeless tobacco. He reports current alcohol use. He reports current drug use. Family History: family history includes Alcohol Abuse in his father; Arthritis in his mother; Liver Disease in his father. The patients home medications have been reviewed. Allergies: Acetaminophen    -------------------------------------------------- RESULTS -------------------------------------------------  All laboratory and radiology results have been personally reviewed by myself   LABS:  No results found for this visit on 06/17/22.     RADIOLOGY:  Interpreted by Radiologist.  No orders to display       ------------------------- NURSING NOTES AND VITALS REVIEWED ---------------------------   The nursing notes within the ED encounter and vital signs as below have been reviewed. /79   Pulse 80   Temp 97.3 °F (36.3 °C)   Resp 18   Ht 6' 2\" (1.88 m)   Wt 200 lb (90.7 kg)   SpO2 100%   BMI 25.68 kg/m²   Oxygen Saturation Interpretation: Normal      ---------------------------------------------------PHYSICAL EXAM--------------------------------------      Constitutional/General: Alert and oriented x3, well appearing, non toxic in NAD  Head: Normocephalic and atraumatic  Eyes: PERRL, EOMI  Mouth: Oropharynx clear, handling secretions, no trismus  Neck: Supple, full ROM,   Pulmonary: Lungs clear to auscultation bilaterally, no wheezes, rales, or rhonchi. Not in respiratory distress  Cardiovascular:  Regular rate and rhythm, no murmurs, gallops, or rubs. 2+ distal pulses  Extremities: Moves all extremities x 4. Warm and well perfused  Skin: warm and dry without rash  Neurologic: GCS 15,  Psych: Normal Affect      ------------------------------ ED COURSE/MEDICAL DECISION MAKING----------------------  Medications - No data to display      ED COURSE:       Medical Decision Making:    Patient is a 59-year-old male presenting to urgent care today with cough and chest congestion lingering from [facility]. His breath sounds are clear and equal bilaterally. No chest pain or shortness of breath. Not tachycardic or febrile at urgent care today. He is nontoxic-appearing and in no acute distress. At this time we will plan for outpatient symptom management with decongestants, cough medication, and steroids. Advised follow very closely with PCP for recheck and return to the emergency department with any new or worsening symptoms. Patient voiced understanding and is agreeable to the above treatment plan. Counseling:    The emergency provider has spoken with the patient and discussed todays results, in addition to providing specific details for the plan of care and counseling regarding the diagnosis and prognosis. Questions are answered at this time and they are agreeable with the plan.      --------------------------------- IMPRESSION AND DISPOSITION ---------------------------------    IMPRESSION  1. COVID-19    2. Cough        DISPOSITION  Disposition: Discharge to home  Patient condition is stable      NOTE: This report was transcribed using voice recognition software.  Every effort was made to ensure accuracy; however, inadvertent computerized transcription errors may be present        Evelia Lemonsma  06/17/22 [id number]